# Patient Record
Sex: FEMALE | Race: WHITE | NOT HISPANIC OR LATINO | Employment: FULL TIME | URBAN - METROPOLITAN AREA
[De-identification: names, ages, dates, MRNs, and addresses within clinical notes are randomized per-mention and may not be internally consistent; named-entity substitution may affect disease eponyms.]

---

## 2023-03-30 DIAGNOSIS — I10 PRIMARY HYPERTENSION: Primary | ICD-10-CM

## 2023-03-30 PROBLEM — Z96.1 PSEUDOPHAKIA OF BOTH EYES: Status: ACTIVE | Noted: 2023-03-30

## 2023-03-30 PROBLEM — H43.813 PVD (POSTERIOR VITREOUS DETACHMENT), BOTH EYES: Status: ACTIVE | Noted: 2023-03-30

## 2023-03-30 PROBLEM — K64.9 HEMORRHOIDS: Status: ACTIVE | Noted: 2023-03-30

## 2023-03-30 PROBLEM — H25.012 CORTICAL AGE-RELATED CATARACT OF LEFT EYE: Status: ACTIVE | Noted: 2023-03-30

## 2023-03-30 PROBLEM — M06.9 RHEUMATOID ARTHRITIS (MULTI): Status: ACTIVE | Noted: 2023-03-30

## 2023-03-30 PROBLEM — I51.89 DIASTOLIC DYSFUNCTION: Status: ACTIVE | Noted: 2023-03-30

## 2023-03-30 PROBLEM — J30.9 ALLERGIC RHINITIS: Status: ACTIVE | Noted: 2023-03-30

## 2023-03-30 PROBLEM — R60.0 PERIPHERAL EDEMA: Status: ACTIVE | Noted: 2023-03-30

## 2023-03-30 PROBLEM — H26.493 PCO (POSTERIOR CAPSULAR OPACIFICATION), BILATERAL: Status: ACTIVE | Noted: 2023-03-30

## 2023-03-30 PROBLEM — E11.9 DIABETES MELLITUS TYPE 2 WITHOUT RETINOPATHY (MULTI): Status: ACTIVE | Noted: 2023-03-30

## 2023-03-30 PROBLEM — D50.9 IRON DEFICIENCY ANEMIA: Status: ACTIVE | Noted: 2023-03-30

## 2023-03-30 PROBLEM — H35.30 AGE-RELATED MACULAR DEGENERATION: Status: ACTIVE | Noted: 2023-03-30

## 2023-03-30 PROBLEM — E55.9 VITAMIN D DEFICIENCY: Status: ACTIVE | Noted: 2023-03-30

## 2023-03-30 PROBLEM — M19.90 INFLAMMATORY ARTHRITIS: Status: ACTIVE | Noted: 2023-03-30

## 2023-03-30 PROBLEM — R42 DIZZINESS: Status: ACTIVE | Noted: 2023-03-30

## 2023-03-30 PROBLEM — R70.0 ESR RAISED: Status: ACTIVE | Noted: 2023-03-30

## 2023-03-30 PROBLEM — M17.0 PRIMARY OSTEOARTHRITIS OF KNEES, BILATERAL: Status: ACTIVE | Noted: 2023-03-30

## 2023-03-30 PROBLEM — H43.399 VITREOUS FLOATERS: Status: ACTIVE | Noted: 2023-03-30

## 2023-03-30 PROBLEM — H43.89 VITREO-RETINAL ADHESIONS: Status: ACTIVE | Noted: 2023-03-30

## 2023-03-30 PROBLEM — H25.011 CORTICAL AGE-RELATED CATARACT OF RIGHT EYE: Status: ACTIVE | Noted: 2023-03-30

## 2023-03-30 PROBLEM — R23.3 EASY BRUISING: Status: ACTIVE | Noted: 2023-03-30

## 2023-03-30 PROBLEM — N39.46 MIXED STRESS AND URGE URINARY INCONTINENCE: Status: ACTIVE | Noted: 2023-03-30

## 2023-03-30 PROBLEM — H35.363 DRUSEN OF MACULA OF BOTH EYES: Status: ACTIVE | Noted: 2023-03-30

## 2023-03-30 PROBLEM — H26.491 POSTERIOR CAPSULAR OPACIFICATION NON VISUALLY SIGNIFICANT OF RIGHT EYE: Status: ACTIVE | Noted: 2023-03-30

## 2023-03-30 PROBLEM — H33.302 RETINAL HOLE OR TEAR, LEFT: Status: ACTIVE | Noted: 2023-03-30

## 2023-03-30 PROBLEM — M15.9 GENERALIZED OSTEOARTHRITIS: Status: ACTIVE | Noted: 2023-03-30

## 2023-03-30 PROBLEM — K57.90 DIVERTICULOSIS: Status: ACTIVE | Noted: 2023-03-30

## 2023-03-30 PROBLEM — Z98.890 HISTORY OF REPAIR OF RETINAL TEAR BY LASER PHOTOCOAGULATION: Status: ACTIVE | Noted: 2023-03-30

## 2023-03-30 PROBLEM — H25.11 AGE-RELATED NUCLEAR CATARACT OF RIGHT EYE: Status: ACTIVE | Noted: 2023-03-30

## 2023-03-30 PROBLEM — K76.0 FATTY LIVER DISEASE, NONALCOHOLIC: Status: ACTIVE | Noted: 2023-03-30

## 2023-03-30 PROBLEM — E66.01 MORBID OBESITY WITH BODY MASS INDEX (BMI) OF 40.0 OR HIGHER (MULTI): Status: ACTIVE | Noted: 2023-03-30

## 2023-03-30 PROBLEM — H52.209 ASTIGMATISM: Status: ACTIVE | Noted: 2023-03-30

## 2023-03-30 PROBLEM — R94.31 ABNORMAL EKG: Status: ACTIVE | Noted: 2023-03-30

## 2023-03-30 PROBLEM — E78.00 HYPERCHOLESTEROLEMIA: Status: ACTIVE | Noted: 2023-03-30

## 2023-03-30 PROBLEM — D12.6 ADENOMATOUS COLON POLYP: Status: ACTIVE | Noted: 2023-03-30

## 2023-03-30 PROBLEM — H52.00 HYPEROPIA: Status: ACTIVE | Noted: 2023-03-30

## 2023-03-30 PROBLEM — H33.312 RETINAL TEAR OF LEFT EYE: Status: ACTIVE | Noted: 2023-03-30

## 2023-03-30 PROBLEM — H52.10 MYOPIA: Status: ACTIVE | Noted: 2023-03-30

## 2023-03-30 PROBLEM — G47.33 OSA (OBSTRUCTIVE SLEEP APNEA): Status: ACTIVE | Noted: 2023-03-30

## 2023-03-30 PROBLEM — H52.31 ANISOMETROPIA: Status: ACTIVE | Noted: 2023-03-30

## 2023-03-30 PROBLEM — H25.12 AGE-RELATED NUCLEAR CATARACT OF LEFT EYE: Status: ACTIVE | Noted: 2023-03-30

## 2023-03-30 PROBLEM — R60.9 PERIPHERAL EDEMA: Status: ACTIVE | Noted: 2023-03-30

## 2023-03-30 PROBLEM — L72.3 SEBACEOUS CYST: Status: ACTIVE | Noted: 2023-03-30

## 2023-03-30 PROBLEM — R21 RASH: Status: ACTIVE | Noted: 2023-03-30

## 2023-03-30 RX ORDER — LISINOPRIL 40 MG/1
TABLET ORAL
Qty: 90 TABLET | Refills: 0 | Status: SHIPPED | OUTPATIENT
Start: 2023-03-30 | End: 2023-08-28 | Stop reason: SDUPTHER

## 2023-04-02 DIAGNOSIS — I10 PRIMARY HYPERTENSION: Primary | ICD-10-CM

## 2023-04-03 RX ORDER — FUROSEMIDE 20 MG/1
TABLET ORAL
Qty: 90 TABLET | Refills: 0 | Status: SHIPPED | OUTPATIENT
Start: 2023-04-03

## 2023-04-18 RX ORDER — TROSPIUM CHLORIDE 20 MG/1
1 TABLET, FILM COATED ORAL 2 TIMES DAILY
COMMUNITY
Start: 2022-11-17 | End: 2023-05-04 | Stop reason: WASHOUT

## 2023-04-18 RX ORDER — VIT C/E/ZN/COPPR/LUTEIN/ZEAXAN 250MG-90MG
1 CAPSULE ORAL 2 TIMES DAILY
COMMUNITY
Start: 2018-06-26

## 2023-04-18 RX ORDER — FUROSEMIDE 20 MG/1
1 TABLET ORAL DAILY
COMMUNITY
Start: 2019-07-30 | End: 2023-05-04 | Stop reason: SDUPTHER

## 2023-04-18 RX ORDER — LEVONORGESTREL 52 MG/1
INTRAUTERINE DEVICE INTRAUTERINE
COMMUNITY

## 2023-04-18 RX ORDER — QUINAPRIL 40 MG/1
1 TABLET ORAL DAILY
COMMUNITY
Start: 2014-06-16 | End: 2023-05-04 | Stop reason: WASHOUT

## 2023-04-18 RX ORDER — ACETAMINOPHEN 500 MG
TABLET ORAL 4 TIMES DAILY
COMMUNITY
Start: 2019-07-24

## 2023-04-18 RX ORDER — METAXALONE 800 MG/1
TABLET ORAL 2 TIMES DAILY
COMMUNITY
Start: 2021-04-10

## 2023-04-18 RX ORDER — SULFASALAZINE 500 MG/1
TABLET ORAL
COMMUNITY
End: 2024-05-23 | Stop reason: SDUPTHER

## 2023-04-18 RX ORDER — METFORMIN HYDROCHLORIDE 500 MG/1
500 TABLET, EXTENDED RELEASE ORAL DAILY
COMMUNITY
End: 2023-08-28 | Stop reason: SDUPTHER

## 2023-04-18 RX ORDER — METHOTREXATE 2.5 MG/1
TABLET ORAL
COMMUNITY
Start: 2019-07-24 | End: 2024-05-23 | Stop reason: ALTCHOICE

## 2023-04-18 RX ORDER — FOLIC ACID 1 MG/1
1 TABLET ORAL DAILY
COMMUNITY
Start: 2019-07-24

## 2023-04-18 RX ORDER — CELECOXIB 200 MG/1
400 CAPSULE ORAL DAILY
COMMUNITY
End: 2024-05-23 | Stop reason: SDUPTHER

## 2023-04-18 RX ORDER — CHOLECALCIFEROL (VITAMIN D3) 25 MCG
TABLET ORAL
COMMUNITY
Start: 2014-01-30

## 2023-04-18 RX ORDER — TROSPIUM CHLORIDE ER 60 MG/1
1 CAPSULE ORAL DAILY
COMMUNITY
Start: 2023-01-23

## 2023-04-18 RX ORDER — ATORVASTATIN CALCIUM 20 MG/1
20 TABLET, FILM COATED ORAL DAILY
COMMUNITY
End: 2023-11-27 | Stop reason: SDUPTHER

## 2023-04-18 RX ORDER — AMLODIPINE BESYLATE 2.5 MG/1
3 TABLET ORAL DAILY
COMMUNITY
Start: 2021-06-22 | End: 2023-06-22 | Stop reason: SDUPTHER

## 2023-04-18 RX ORDER — BUPROPION HYDROCHLORIDE 300 MG/1
1 TABLET ORAL DAILY
COMMUNITY
Start: 2022-02-02

## 2023-05-04 ENCOUNTER — OFFICE VISIT (OUTPATIENT)
Dept: PRIMARY CARE | Facility: CLINIC | Age: 63
End: 2023-05-04
Payer: COMMERCIAL

## 2023-05-04 VITALS — SYSTOLIC BLOOD PRESSURE: 132 MMHG | HEART RATE: 84 BPM | DIASTOLIC BLOOD PRESSURE: 76 MMHG

## 2023-05-04 DIAGNOSIS — G47.33 OSA (OBSTRUCTIVE SLEEP APNEA): ICD-10-CM

## 2023-05-04 DIAGNOSIS — D12.3 ADENOMATOUS POLYP OF TRANSVERSE COLON: ICD-10-CM

## 2023-05-04 DIAGNOSIS — E11.9 DIABETES MELLITUS TYPE 2 WITHOUT RETINOPATHY (MULTI): Primary | ICD-10-CM

## 2023-05-04 DIAGNOSIS — M06.09 RHEUMATOID ARTHRITIS OF MULTIPLE SITES WITH NEGATIVE RHEUMATOID FACTOR (MULTI): ICD-10-CM

## 2023-05-04 DIAGNOSIS — E78.00 HYPERCHOLESTEROLEMIA: ICD-10-CM

## 2023-05-04 DIAGNOSIS — E55.9 VITAMIN D DEFICIENCY: ICD-10-CM

## 2023-05-04 DIAGNOSIS — K76.0 FATTY LIVER DISEASE, NONALCOHOLIC: ICD-10-CM

## 2023-05-04 DIAGNOSIS — I10 PRIMARY HYPERTENSION: ICD-10-CM

## 2023-05-04 PROCEDURE — 3075F SYST BP GE 130 - 139MM HG: CPT | Performed by: INTERNAL MEDICINE

## 2023-05-04 PROCEDURE — 99214 OFFICE O/P EST MOD 30 MIN: CPT | Performed by: INTERNAL MEDICINE

## 2023-05-04 PROCEDURE — 1036F TOBACCO NON-USER: CPT | Performed by: INTERNAL MEDICINE

## 2023-05-04 PROCEDURE — 3078F DIAST BP <80 MM HG: CPT | Performed by: INTERNAL MEDICINE

## 2023-05-04 PROCEDURE — 1160F RVW MEDS BY RX/DR IN RCRD: CPT | Performed by: INTERNAL MEDICINE

## 2023-05-04 PROCEDURE — 1159F MED LIST DOCD IN RCRD: CPT | Performed by: INTERNAL MEDICINE

## 2023-05-04 PROCEDURE — 3044F HG A1C LEVEL LT 7.0%: CPT | Performed by: INTERNAL MEDICINE

## 2023-05-04 PROCEDURE — 4010F ACE/ARB THERAPY RXD/TAKEN: CPT | Performed by: INTERNAL MEDICINE

## 2023-05-04 ASSESSMENT — ENCOUNTER SYMPTOMS
SLEEP DISTURBANCE: 0
JOINT SWELLING: 0
HEMATURIA: 0
WHEEZING: 0
ABDOMINAL PAIN: 0
CONSTIPATION: 1
FATIGUE: 0
BRUISES/BLEEDS EASILY: 0
PALPITATIONS: 0
WEAKNESS: 0
DIZZINESS: 1
HEADACHES: 0
BACK PAIN: 0
LIGHT-HEADEDNESS: 0
CHILLS: 0
NAUSEA: 0
EYE DISCHARGE: 0
ACTIVITY CHANGE: 0
POLYDIPSIA: 0
DIARRHEA: 0
COUGH: 0
CONFUSION: 0
RHINORRHEA: 0
NUMBNESS: 0
SINUS PRESSURE: 0
EYE PAIN: 0
FEVER: 0
POLYPHAGIA: 0
FREQUENCY: 0
VOMITING: 0
EYE ITCHING: 0
SORE THROAT: 0
DIFFICULTY URINATING: 0
FLANK PAIN: 0
MYALGIAS: 0
DYSURIA: 0
NERVOUS/ANXIOUS: 0
BLOOD IN STOOL: 0
DYSPHORIC MOOD: 0
NECK PAIN: 0
SHORTNESS OF BREATH: 0
ARTHRALGIAS: 1
ADENOPATHY: 0
CHEST TIGHTNESS: 0
FACIAL SWELLING: 0
DIAPHORESIS: 0
APPETITE CHANGE: 0

## 2023-05-04 NOTE — ASSESSMENT & PLAN NOTE
continues on sulfasalazine and methotrexate (and folate)per rheumatologist Dr. Ashford  CBC and ESR ordered per patient request

## 2023-05-04 NOTE — ASSESSMENT & PLAN NOTE
BP slightly elevated; does have white coat component and thus advised continued monitoring and call with results in the next 2 weeks  - Continue amlodipine to 7.5 mg daily ,Accupril 40 milligrams daily and Furosemide 20 mg daily;   - Urge DASH diet, urge regular exercise regimen

## 2023-05-04 NOTE — PROGRESS NOTES
Subjective   Patient ID: Belle Alarcon is a 63 y.o. female who presents for Follow-up (3m).    She has a history of DM2, hypertension, hypercholesterolemia, vitamin D deficiency, peripheral edema, and RA    BP at home: not checking but did purchase a sphygmomanometer         Review of Systems   Constitutional:  Negative for activity change, appetite change, chills, diaphoresis, fatigue and fever.   HENT:  Negative for congestion, ear discharge, ear pain, facial swelling, postnasal drip, rhinorrhea, sinus pressure and sore throat.    Eyes:  Negative for pain, discharge and itching.   Respiratory:  Negative for cough, chest tightness, shortness of breath and wheezing.    Cardiovascular:  Negative for chest pain, palpitations and leg swelling.   Gastrointestinal:  Positive for constipation (not currently but occasionally). Negative for abdominal pain, blood in stool, diarrhea, nausea and vomiting.   Endocrine: Negative for cold intolerance, heat intolerance, polydipsia, polyphagia and polyuria.   Genitourinary:  Negative for difficulty urinating, dysuria, flank pain, frequency and hematuria.   Musculoskeletal:  Positive for arthralgias (knees bilaterally). Negative for back pain, joint swelling, myalgias and neck pain.   Skin: Negative.    Neurological:  Positive for dizziness (very occasional vertigo with changes). Negative for syncope, weakness, light-headedness, numbness and headaches.   Hematological:  Negative for adenopathy. Does not bruise/bleed easily.   Psychiatric/Behavioral:  Negative for behavioral problems, confusion, dysphoric mood and sleep disturbance. The patient is not nervous/anxious.        Objective   /76   Pulse 84     Physical Exam  Constitutional:       Appearance: Normal appearance. She is obese.   HENT:      Head: Normocephalic and atraumatic.   Eyes:      Pupils: Pupils are equal, round, and reactive to light.   Cardiovascular:      Rate and Rhythm: Normal rate and regular rhythm.       Pulses: Normal pulses.      Heart sounds: Normal heart sounds.   Pulmonary:      Effort: Pulmonary effort is normal.      Breath sounds: Normal breath sounds.   Abdominal:      General: Bowel sounds are normal.      Palpations: Abdomen is soft. There is no mass.      Tenderness: There is no abdominal tenderness.      Hernia: No hernia is present.   Musculoskeletal:      Right lower leg: No edema.      Left lower leg: No edema.   Skin:     General: Skin is warm and dry.   Neurological:      General: No focal deficit present.      Mental Status: She is alert and oriented to person, place, and time. Mental status is at baseline.      Gait: Gait abnormal (slow with cane).   Psychiatric:         Mood and Affect: Mood normal.         Behavior: Behavior normal.         Thought Content: Thought content normal.         Judgment: Judgment normal.         Assessment/Plan   Problem List Items Addressed This Visit          Nervous    OLIVIA (obstructive sleep apnea)    Relevant Orders    Referral to Adult Sleep Medicine       Circulatory    Hypertension      BP slightly elevated; does have white coat component and thus advised continued monitoring and call with results in the next 2 weeks  - Continue amlodipine to 7.5 mg daily ,Accupril 40 milligrams daily and Furosemide 20 mg daily;   - Urge DASH diet, urge regular exercise regimen          Relevant Orders    Comprehensive Metabolic Panel       Digestive    Adenomatous colon polyp    Relevant Orders    Colonoscopy    Fatty liver disease, nonalcoholic     Urge avoid hepatotoxins and urge weight loss   LFTs            Endocrine/Metabolic    Diabetes mellitus type 2 without retinopathy (CMS/HCC) - Primary      controlled   continue Glucophage  milligrams daily; check HbA1C;   had ophthalmology evaluation 7/27/21-no retinopathy - advise schedule yearly         Relevant Orders    Hemoglobin A1C    Vitamin D deficiency     Will check Vitamin D 25-OH and will further advise          Relevant Orders    Vitamin D, Total       Other    Hypercholesterolemia     Continue atorvastatin 20 mg daily   FLP/ LFTs         Relevant Orders    Lipid Panel    Rheumatoid arthritis (CMS/HCC)     continues on sulfasalazine and methotrexate (and folate)per rheumatologist Dr. Ashford  CBC and ESR ordered per patient request          Relevant Orders    Comprehensive Metabolic Panel    CBC and Auto Differential    Sedimentation Rate

## 2023-05-04 NOTE — ASSESSMENT & PLAN NOTE
controlled   continue Glucophage  milligrams daily; check HbA1C;   had ophthalmology evaluation 7/27/21-no retinopathy - advise schedule yearly

## 2023-05-30 ENCOUNTER — LAB (OUTPATIENT)
Dept: LAB | Facility: LAB | Age: 63
End: 2023-05-30
Payer: COMMERCIAL

## 2023-05-30 DIAGNOSIS — M06.09 RHEUMATOID ARTHRITIS OF MULTIPLE SITES WITH NEGATIVE RHEUMATOID FACTOR (MULTI): ICD-10-CM

## 2023-05-30 DIAGNOSIS — E11.9 DIABETES MELLITUS TYPE 2 WITHOUT RETINOPATHY (MULTI): ICD-10-CM

## 2023-05-30 DIAGNOSIS — E78.00 HYPERCHOLESTEROLEMIA: ICD-10-CM

## 2023-05-30 DIAGNOSIS — E55.9 VITAMIN D DEFICIENCY: ICD-10-CM

## 2023-05-30 DIAGNOSIS — I10 PRIMARY HYPERTENSION: ICD-10-CM

## 2023-05-30 LAB
ALANINE AMINOTRANSFERASE (SGPT) (U/L) IN SER/PLAS: 10 U/L (ref 7–45)
ALANINE AMINOTRANSFERASE (SGPT) (U/L) IN SER/PLAS: NORMAL
ALBUMIN (G/DL) IN SER/PLAS: 3.9 G/DL (ref 3.4–5)
ALBUMIN (G/DL) IN SER/PLAS: NORMAL
ALKALINE PHOSPHATASE (U/L) IN SER/PLAS: 128 U/L (ref 33–136)
ALKALINE PHOSPHATASE (U/L) IN SER/PLAS: NORMAL
ANION GAP IN SER/PLAS: 16 MMOL/L (ref 10–20)
ANION GAP IN SER/PLAS: NORMAL
ASPARTATE AMINOTRANSFERASE (SGOT) (U/L) IN SER/PLAS: 11 U/L (ref 9–39)
ASPARTATE AMINOTRANSFERASE (SGOT) (U/L) IN SER/PLAS: NORMAL
BASOPHILS (10*3/UL) IN BLOOD BY AUTOMATED COUNT: 0.06 X10E9/L (ref 0–0.1)
BASOPHILS/100 LEUKOCYTES IN BLOOD BY AUTOMATED COUNT: 0.8 % (ref 0–2)
BILIRUBIN TOTAL (MG/DL) IN SER/PLAS: 0.3 MG/DL (ref 0–1.2)
BILIRUBIN TOTAL (MG/DL) IN SER/PLAS: NORMAL
C REACTIVE PROTEIN (MG/L) IN SER/PLAS: 1.47 MG/DL
CALCIDIOL (25 OH VITAMIN D3) (NG/ML) IN SER/PLAS: 64 NG/ML
CALCIUM (MG/DL) IN SER/PLAS: 9.9 MG/DL (ref 8.6–10.6)
CALCIUM (MG/DL) IN SER/PLAS: NORMAL
CARBON DIOXIDE, TOTAL (MMOL/L) IN SER/PLAS: 24 MMOL/L (ref 21–32)
CARBON DIOXIDE, TOTAL (MMOL/L) IN SER/PLAS: NORMAL
CHLORIDE (MMOL/L) IN SER/PLAS: 107 MMOL/L (ref 98–107)
CHLORIDE (MMOL/L) IN SER/PLAS: NORMAL
CHOLESTEROL (MG/DL) IN SER/PLAS: 167 MG/DL (ref 0–199)
CHOLESTEROL IN HDL (MG/DL) IN SER/PLAS: 51.6 MG/DL
CHOLESTEROL/HDL RATIO: 3.2
CREATININE (MG/DL) IN SER/PLAS: 0.87 MG/DL (ref 0.5–1.05)
CREATININE (MG/DL) IN SER/PLAS: NORMAL
EOSINOPHILS (10*3/UL) IN BLOOD BY AUTOMATED COUNT: 0.08 X10E9/L (ref 0–0.7)
EOSINOPHILS/100 LEUKOCYTES IN BLOOD BY AUTOMATED COUNT: 1.1 % (ref 0–6)
ERYTHROCYTE DISTRIBUTION WIDTH (RATIO) BY AUTOMATED COUNT: 14.8 % (ref 11.5–14.5)
ERYTHROCYTE DISTRIBUTION WIDTH (RATIO) BY AUTOMATED COUNT: NORMAL
ERYTHROCYTE MEAN CORPUSCULAR HEMOGLOBIN CONCENTRATION (G/DL) BY AUTOMATED: 31.3 G/DL (ref 32–36)
ERYTHROCYTE MEAN CORPUSCULAR HEMOGLOBIN CONCENTRATION (G/DL) BY AUTOMATED: NORMAL
ERYTHROCYTE MEAN CORPUSCULAR VOLUME (FL) BY AUTOMATED COUNT: 93 FL (ref 80–100)
ERYTHROCYTE MEAN CORPUSCULAR VOLUME (FL) BY AUTOMATED COUNT: NORMAL
ERYTHROCYTES (10*6/UL) IN BLOOD BY AUTOMATED COUNT: 4 X10E12/L (ref 4–5.2)
ERYTHROCYTES (10*6/UL) IN BLOOD BY AUTOMATED COUNT: NORMAL
ESTIMATED AVERAGE GLUCOSE FOR HBA1C: 146 MG/DL
GFR FEMALE: 74 ML/MIN/1.73M2
GFR FEMALE: NORMAL
GFR MALE: NORMAL
GLUCOSE (MG/DL) IN SER/PLAS: 134 MG/DL (ref 74–99)
GLUCOSE (MG/DL) IN SER/PLAS: NORMAL
HEMATOCRIT (%) IN BLOOD BY AUTOMATED COUNT: 37.1 % (ref 36–46)
HEMATOCRIT (%) IN BLOOD BY AUTOMATED COUNT: NORMAL
HEMOGLOBIN (G/DL) IN BLOOD: 11.6 G/DL (ref 12–16)
HEMOGLOBIN (G/DL) IN BLOOD: NORMAL
HEMOGLOBIN A1C/HEMOGLOBIN TOTAL IN BLOOD: 6.7 %
IMMATURE GRANULOCYTES/100 LEUKOCYTES IN BLOOD BY AUTOMATED COUNT: 0.1 % (ref 0–0.9)
LDL: 100 MG/DL (ref 0–99)
LEUKOCYTES (10*3/UL) IN BLOOD BY AUTOMATED COUNT: 7.5 X10E9/L (ref 4.4–11.3)
LEUKOCYTES (10*3/UL) IN BLOOD BY AUTOMATED COUNT: NORMAL
LYMPHOCYTES (10*3/UL) IN BLOOD BY AUTOMATED COUNT: 2.04 X10E9/L (ref 1.2–4.8)
LYMPHOCYTES/100 LEUKOCYTES IN BLOOD BY AUTOMATED COUNT: 27.4 % (ref 13–44)
MONOCYTES (10*3/UL) IN BLOOD BY AUTOMATED COUNT: 0.57 X10E9/L (ref 0.1–1)
MONOCYTES/100 LEUKOCYTES IN BLOOD BY AUTOMATED COUNT: 7.7 % (ref 2–10)
NEUTROPHILS (10*3/UL) IN BLOOD BY AUTOMATED COUNT: 4.69 X10E9/L (ref 1.2–7.7)
NEUTROPHILS/100 LEUKOCYTES IN BLOOD BY AUTOMATED COUNT: 62.9 % (ref 40–80)
NRBC (PER 100 WBCS) BY AUTOMATED COUNT: 0 /100 WBC (ref 0–0)
NRBC (PER 100 WBCS) BY AUTOMATED COUNT: NORMAL
PLATELETS (10*3/UL) IN BLOOD AUTOMATED COUNT: 217 X10E9/L (ref 150–450)
PLATELETS (10*3/UL) IN BLOOD AUTOMATED COUNT: NORMAL
POTASSIUM (MMOL/L) IN SER/PLAS: 4.6 MMOL/L (ref 3.5–5.3)
POTASSIUM (MMOL/L) IN SER/PLAS: NORMAL
PROTEIN TOTAL: 6.8 G/DL (ref 6.4–8.2)
PROTEIN TOTAL: NORMAL
SEDIMENTATION RATE, ERYTHROCYTE: 34 MM/H (ref 0–30)
SEDIMENTATION RATE, ERYTHROCYTE: NORMAL
SODIUM (MMOL/L) IN SER/PLAS: 142 MMOL/L (ref 136–145)
SODIUM (MMOL/L) IN SER/PLAS: NORMAL
TRIGLYCERIDE (MG/DL) IN SER/PLAS: 75 MG/DL (ref 0–149)
UREA NITROGEN (MG/DL) IN SER/PLAS: 31 MG/DL (ref 6–23)
UREA NITROGEN (MG/DL) IN SER/PLAS: NORMAL
VLDL: 15 MG/DL (ref 0–40)

## 2023-05-30 PROCEDURE — 36415 COLL VENOUS BLD VENIPUNCTURE: CPT

## 2023-05-30 PROCEDURE — 83036 HEMOGLOBIN GLYCOSYLATED A1C: CPT

## 2023-05-30 PROCEDURE — 82607 VITAMIN B-12: CPT

## 2023-05-30 PROCEDURE — 85025 COMPLETE CBC W/AUTO DIFF WBC: CPT

## 2023-05-30 PROCEDURE — 82746 ASSAY OF FOLIC ACID SERUM: CPT

## 2023-05-30 PROCEDURE — 82306 VITAMIN D 25 HYDROXY: CPT

## 2023-05-30 PROCEDURE — 85652 RBC SED RATE AUTOMATED: CPT

## 2023-05-30 PROCEDURE — 80053 COMPREHEN METABOLIC PANEL: CPT

## 2023-05-30 PROCEDURE — 80061 LIPID PANEL: CPT

## 2023-05-31 LAB
COBALAMIN (VITAMIN B12) (PG/ML) IN SER/PLAS: 203 PG/ML (ref 211–911)
FOLATE (NG/ML) IN SER/PLAS: >24 NG/ML

## 2023-06-22 DIAGNOSIS — I10 HYPERTENSION, UNSPECIFIED TYPE: ICD-10-CM

## 2023-06-22 RX ORDER — QUINAPRIL 40 MG/1
40 TABLET ORAL
COMMUNITY
Start: 2019-07-16

## 2023-06-22 RX ORDER — HYDROCHLOROTHIAZIDE 25 MG/1
25 TABLET ORAL
COMMUNITY
Start: 2019-07-18 | End: 2024-05-23 | Stop reason: ALTCHOICE

## 2023-06-22 RX ORDER — IBUPROFEN 800 MG/1
800 TABLET ORAL EVERY 8 HOURS PRN
COMMUNITY
Start: 2012-03-25 | End: 2024-05-23 | Stop reason: ALTCHOICE

## 2023-06-22 RX ORDER — CYCLOBENZAPRINE HCL 10 MG
10 TABLET ORAL EVERY 8 HOURS PRN
COMMUNITY
Start: 2012-03-25 | End: 2024-05-23 | Stop reason: ALTCHOICE

## 2023-06-23 RX ORDER — AMLODIPINE BESYLATE 2.5 MG/1
7.5 TABLET ORAL DAILY
Qty: 270 TABLET | Refills: 1 | Status: SHIPPED | OUTPATIENT
Start: 2023-06-23 | End: 2023-12-21 | Stop reason: SDUPTHER

## 2023-07-21 PROBLEM — S80.00XA CONTUSION OF KNEE: Status: ACTIVE | Noted: 2019-08-18
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
## 2023-07-21 PROBLEM — R92.8 ABNORMAL FINDING ON MAMMOGRAPHY: Status: ACTIVE | Noted: 2023-07-21

## 2023-07-21 PROBLEM — M25.569 KNEE PAIN: Status: ACTIVE | Noted: 2019-08-18

## 2023-07-21 PROBLEM — F32.A DEPRESSIVE DISORDER: Status: ACTIVE | Noted: 2019-08-18

## 2023-07-21 PROBLEM — H61.20 IMPACTED CERUMEN: Status: ACTIVE | Noted: 2019-08-18

## 2023-07-21 PROBLEM — Z98.890 HISTORY OF LASER REFRACTIVE SURGERY: Status: ACTIVE | Noted: 2023-07-21

## 2023-07-21 PROBLEM — R60.9 EDEMA: Status: ACTIVE | Noted: 2019-08-18

## 2023-07-21 PROBLEM — T14.90XA INJURY: Status: ACTIVE | Noted: 2019-08-18

## 2023-07-21 PROBLEM — R79.9 ABNORMAL BLOOD CHEMISTRY LEVEL: Status: ACTIVE | Noted: 2019-08-18

## 2023-07-21 PROBLEM — H10.9 CONJUNCTIVITIS: Status: ACTIVE | Noted: 2019-08-18

## 2023-07-21 PROBLEM — I10 BENIGN ESSENTIAL HYPERTENSION: Status: ACTIVE | Noted: 2019-08-18

## 2023-07-21 PROBLEM — F41.1 ANXIETY STATE: Status: ACTIVE | Noted: 2019-08-18

## 2023-08-28 DIAGNOSIS — I10 PRIMARY HYPERTENSION: ICD-10-CM

## 2023-08-28 DIAGNOSIS — E11.9 DIABETES MELLITUS TYPE 2 WITHOUT RETINOPATHY (MULTI): Primary | ICD-10-CM

## 2023-08-28 RX ORDER — METFORMIN HYDROCHLORIDE 500 MG/1
500 TABLET, EXTENDED RELEASE ORAL DAILY
Qty: 90 TABLET | Refills: 1 | Status: SHIPPED | OUTPATIENT
Start: 2023-08-28

## 2023-08-28 RX ORDER — LISINOPRIL 40 MG/1
40 TABLET ORAL DAILY
Qty: 90 TABLET | Refills: 1 | Status: SHIPPED | OUTPATIENT
Start: 2023-08-28

## 2023-11-14 ASSESSMENT — SLIT LAMP EXAM - LIDS
COMMENTS: GOOD POSITION
COMMENTS: GOOD POSITION

## 2023-11-14 ASSESSMENT — CUP TO DISC RATIO
OD_RATIO: 0.3
OS_RATIO: 0.3

## 2023-11-14 ASSESSMENT — EXTERNAL EXAM - LEFT EYE: OS_EXAM: NORMAL

## 2023-11-14 ASSESSMENT — EXTERNAL EXAM - RIGHT EYE: OD_EXAM: NORMAL

## 2023-11-14 NOTE — PROGRESS NOTES
Diabetes mellitus type 2 without msupfqhimzcG69.9  -HbA1c= 6.7 (5/30/23). No diabetic retinopathy seen on dilated exam. Continue close monitoring of blood glucose, blood pressure, and cholesterol. Plan for annual dilated eye exam.    History of YAG laser capsulotomy, left eye  PCO (posterior capsular opacification), tioxoF43.491  PCO (posterior capsular opacification), leftH26.492  Pseudophakia of both eyesZ96.1  -S/p YAG capsulotomy OS 9/28/21 - vision improved  -Now with visually significant PCO OD. Symptoms: Gradual decrease in vision OD x months. Harder to read small print. More difficulty seeing small words/numbers on TV. Having more trouble seeing road signs when driving. Glare from headlights when driving at night.   -Discussed with patient option of YAG capsulotomy OD - patient would like to proceed. Will schedule - f/u next available YAG capsulotomy OD at [facility] - refract, glare/BAT, dilate OD only.     Drusen (degenerative) of macula, rxfinofxkV34.363  History of repair of retinal tear by laser glnpsqiiwyhmcutoZ78.890  Retinal hole or tear, leftH33.302  Vitreo-retinal szuypqlotT72.89  PVD (posterior vitreous detachment), both eyesH43.813  -History of retinal tear s/p LR OS around 2018  -No retinal tear or detachment seen on exam. Retinal detachment symptoms discussed.   -Plan for OCT macula OU at follow up visit after YAG capsulotomy.     Dry eyes, bilateral  -Use artificial tears 2-4 times a day (Refresh, Systane, Theratears, Genteal, Blink)    Hx of ETSSOU21.890  KjbontxcvH03.00  UlqhkfA35.10  JdjlznrkkutX88.209  -F/u next available YAG capsulotomy OD at [facility] - refract, glare/BAT, dilate OD only.   -New Rx given per patient request - d/w patient it's possible Rx may change following YAG capsulotomy. Patient states her understanding.

## 2023-11-15 ENCOUNTER — OFFICE VISIT (OUTPATIENT)
Dept: OPHTHALMOLOGY | Facility: CLINIC | Age: 63
End: 2023-11-15
Payer: COMMERCIAL

## 2023-11-15 DIAGNOSIS — Z98.42 HISTORY OF YAG LASER CAPSULOTOMY OF LENS OF LEFT EYE: ICD-10-CM

## 2023-11-15 DIAGNOSIS — H52.01 HYPEROPIA OF RIGHT EYE: ICD-10-CM

## 2023-11-15 DIAGNOSIS — H26.491 PCO (POSTERIOR CAPSULAR OPACIFICATION), RIGHT: ICD-10-CM

## 2023-11-15 DIAGNOSIS — H35.363 DRUSEN OF MACULA OF BOTH EYES: ICD-10-CM

## 2023-11-15 DIAGNOSIS — Z98.890 HISTORY OF REPAIR OF RETINAL TEAR BY LASER PHOTOCOAGULATION: ICD-10-CM

## 2023-11-15 DIAGNOSIS — H43.89 VITREO-RETINAL ADHESIONS: ICD-10-CM

## 2023-11-15 DIAGNOSIS — Z96.1 PSEUDOPHAKIA: ICD-10-CM

## 2023-11-15 DIAGNOSIS — H52.201 ASTIGMATISM OF RIGHT EYE, UNSPECIFIED TYPE: ICD-10-CM

## 2023-11-15 DIAGNOSIS — Z98.890 HISTORY OF LASER REFRACTIVE SURGERY: ICD-10-CM

## 2023-11-15 DIAGNOSIS — H26.492 PCO (POSTERIOR CAPSULAR OPACIFICATION), LEFT: ICD-10-CM

## 2023-11-15 DIAGNOSIS — H04.123 DRY EYES, BILATERAL: ICD-10-CM

## 2023-11-15 DIAGNOSIS — H33.302 RETINAL HOLE OR TEAR, LEFT: ICD-10-CM

## 2023-11-15 DIAGNOSIS — H52.4 PRESBYOPIA: ICD-10-CM

## 2023-11-15 DIAGNOSIS — E11.9 CONTROLLED TYPE 2 DIABETES MELLITUS WITHOUT COMPLICATION, WITHOUT LONG-TERM CURRENT USE OF INSULIN (MULTI): Primary | ICD-10-CM

## 2023-11-15 DIAGNOSIS — H43.813 PVD (POSTERIOR VITREOUS DETACHMENT), BOTH EYES: ICD-10-CM

## 2023-11-15 DIAGNOSIS — H52.12 MYOPIA OF LEFT EYE: ICD-10-CM

## 2023-11-15 PROCEDURE — 92014 COMPRE OPH EXAM EST PT 1/>: CPT | Performed by: OPHTHALMOLOGY

## 2023-11-15 PROCEDURE — 92015 DETERMINE REFRACTIVE STATE: CPT | Performed by: OPHTHALMOLOGY

## 2023-11-15 ASSESSMENT — ENCOUNTER SYMPTOMS
ENDOCRINE NEGATIVE: 0
ALLERGIC/IMMUNOLOGIC NEGATIVE: 0
MUSCULOSKELETAL NEGATIVE: 0
GASTROINTESTINAL NEGATIVE: 0
RESPIRATORY NEGATIVE: 0
PSYCHIATRIC NEGATIVE: 0
CONSTITUTIONAL NEGATIVE: 0
EYES NEGATIVE: 0
HEMATOLOGIC/LYMPHATIC NEGATIVE: 0
CARDIOVASCULAR NEGATIVE: 0
NEUROLOGICAL NEGATIVE: 0

## 2023-11-15 ASSESSMENT — REFRACTION_WEARINGRX
OD_CYLINDER: -0.75
OD_SPHERE: +0.25
OS_SPHERE: +0.75
OS_AXIS: 155
OS_ADD: +2.50
OD_AXIS: 109
SPECS_TYPE: PAL
OD_ADD: +2.50
OS_CYLINDER: -0.25

## 2023-11-15 ASSESSMENT — VISUAL ACUITY
CORRECTION_TYPE: GLASSES
OD_CC: 20/30
OS_CC: 20/25
OS_BAT_HIGH: 20/25
METHOD: SNELLEN - LINEAR
OD_BAT_HIGH: 20/200
OD_CC+: -2

## 2023-11-15 ASSESSMENT — CONF VISUAL FIELD
OS_SUPERIOR_NASAL_RESTRICTION: 0
OD_NORMAL: 1
OD_SUPERIOR_NASAL_RESTRICTION: 0
OD_INFERIOR_NASAL_RESTRICTION: 0
OD_INFERIOR_TEMPORAL_RESTRICTION: 0
OD_SUPERIOR_TEMPORAL_RESTRICTION: 0
OS_INFERIOR_TEMPORAL_RESTRICTION: 0
OS_INFERIOR_NASAL_RESTRICTION: 0
OS_SUPERIOR_TEMPORAL_RESTRICTION: 0
OS_NORMAL: 1

## 2023-11-15 ASSESSMENT — REFRACTION_MANIFEST
OD_CYLINDER: -1.50
OS_CYLINDER: -0.25
OS_ADD: +2.50
OD_AXIS: 110
OS_SPHERE: -0.75
OD_SPHERE: -0.50
OD_ADD: +2.50
OS_AXIS: 155

## 2023-11-15 ASSESSMENT — TONOMETRY
OS_IOP_MMHG: 17
IOP_METHOD: TONOPEN
OD_IOP_MMHG: 18

## 2023-11-27 DIAGNOSIS — E78.00 HYPERCHOLESTEROLEMIA: Primary | ICD-10-CM

## 2023-11-27 RX ORDER — ATORVASTATIN CALCIUM 20 MG/1
20 TABLET, FILM COATED ORAL DAILY
Qty: 90 TABLET | Refills: 0 | Status: SHIPPED | OUTPATIENT
Start: 2023-11-27

## 2023-12-21 DIAGNOSIS — I10 HYPERTENSION, UNSPECIFIED TYPE: ICD-10-CM

## 2023-12-21 RX ORDER — AMLODIPINE BESYLATE 2.5 MG/1
7.5 TABLET ORAL DAILY
Qty: 270 TABLET | Refills: 0 | Status: SHIPPED | OUTPATIENT
Start: 2023-12-21

## 2024-02-04 ASSESSMENT — SLIT LAMP EXAM - LIDS
COMMENTS: GOOD POSITION
COMMENTS: GOOD POSITION

## 2024-02-04 ASSESSMENT — EXTERNAL EXAM - RIGHT EYE: OD_EXAM: NORMAL

## 2024-02-04 ASSESSMENT — EXTERNAL EXAM - LEFT EYE: OS_EXAM: NORMAL

## 2024-02-04 ASSESSMENT — CUP TO DISC RATIO: OD_RATIO: 0.3

## 2024-02-05 NOTE — PROGRESS NOTES
History of YAG laser capsulotomy, left eye  PCO (posterior capsular opacification), zcbvhT82.491  PCO (posterior capsular opacification), leftH26.492  Pseudophakia of both eyesZ96.1  -S/p YAG capsulotomy OS 9/28/21 - vision improved  -Visually significant PCO right eye. Symptoms: Gradual decrease in vision OD x months. Harder to read small print. More difficulty seeing small words/numbers on TV. Having more trouble seeing road signs when driving. Glare from headlights when driving at night.  Discussed diagnosis with patient and option of YAG capsulotomy. Discussed procedure. Discussed potential risks, benefits, and alternatives, including but not limited to: pain, inflammation, edema, retinal tear/detachment, floaters, increased eye pressure, damage to other ocular structures, damage to/dislocation of lens implant, glare, need to repeat procedure, loss of vision or loss of eye. Patient understands and wishes to proceed. Consent signed.  YAG capsulotomy right eye done February 6, 2024. Advised to use artificial tears PRN. F/u 4-6 weeks - refract OU, dilate OD, OCT macula. Call or return sooner if any redness, pain, decreased vision, flashes, or floaters.   Pulse = 47  TE = 37.6  Power = 0.8 mJ  Post-YAG IOP = 18    Diabetes mellitus type 2 without gvlkxshxyieY55.9  -HbA1c= 6.7 (5/30/23). No diabetic retinopathy seen on dilated exam. Continue close monitoring of blood glucose, blood pressure, and cholesterol. Plan for annual dilated eye exam.    Drusen (degenerative) of macula, urcgufwfqT85.363  History of repair of retinal tear by laser durgbozrkuyuvoceP07.890  Retinal hole or tear, leftH33.302  Vitreo-retinal ktslmowemN39.89  PVD (posterior vitreous detachment), both eyesH43.813  -History of retinal tear s/p LR OS around 2018  -No retinal tear or detachment seen on exam. Retinal detachment symptoms discussed.   -Plan for OCT macula OU at follow up visit after YAG capsulotomy.     Dry eyes, bilateral  -Use  artificial tears 2-4 times a day (Refresh, Systane, Theratears, Genteal, Blink)    Hx of GNUEPU70.890  GxsttpdvjN58.00  PunxhaK17.10  UgotfbskptfP58.209  -F/u 4-6 weeks - refract OU, dilate OD, OCT macula.    -New Rx given per patient request - difficult refraction OD today due to visually significant PCO. Patient obtained new glasses at end of December and having difficulty with Rx. D/w patient it's possible Rx may change following YAG capsulotomy - ideally would recommend changing Rx after next visit, but may not be able to depending on return policy. Patient states her understanding.

## 2024-02-06 ENCOUNTER — OFFICE VISIT (OUTPATIENT)
Dept: OPHTHALMOLOGY | Facility: CLINIC | Age: 64
End: 2024-02-06
Payer: COMMERCIAL

## 2024-02-06 DIAGNOSIS — H04.123 DRY EYES, BILATERAL: ICD-10-CM

## 2024-02-06 DIAGNOSIS — H33.302 RETINAL HOLE OR TEAR, LEFT: ICD-10-CM

## 2024-02-06 DIAGNOSIS — Z98.890 HISTORY OF LASER REFRACTIVE SURGERY: ICD-10-CM

## 2024-02-06 DIAGNOSIS — H52.201 ASTIGMATISM OF RIGHT EYE, UNSPECIFIED TYPE: ICD-10-CM

## 2024-02-06 DIAGNOSIS — H35.363 DRUSEN OF MACULA OF BOTH EYES: ICD-10-CM

## 2024-02-06 DIAGNOSIS — E11.9 CONTROLLED TYPE 2 DIABETES MELLITUS WITHOUT COMPLICATION, WITHOUT LONG-TERM CURRENT USE OF INSULIN (MULTI): ICD-10-CM

## 2024-02-06 DIAGNOSIS — H43.813 PVD (POSTERIOR VITREOUS DETACHMENT), BOTH EYES: ICD-10-CM

## 2024-02-06 DIAGNOSIS — H52.4 PRESBYOPIA: ICD-10-CM

## 2024-02-06 DIAGNOSIS — H26.492 PCO (POSTERIOR CAPSULAR OPACIFICATION), LEFT: ICD-10-CM

## 2024-02-06 DIAGNOSIS — Z98.890 HISTORY OF REPAIR OF RETINAL TEAR BY LASER PHOTOCOAGULATION: ICD-10-CM

## 2024-02-06 DIAGNOSIS — H43.89 VITREO-RETINAL ADHESIONS: ICD-10-CM

## 2024-02-06 DIAGNOSIS — Z98.42 HISTORY OF YAG LASER CAPSULOTOMY OF LENS OF LEFT EYE: ICD-10-CM

## 2024-02-06 DIAGNOSIS — H52.01 HYPEROPIA OF RIGHT EYE: ICD-10-CM

## 2024-02-06 DIAGNOSIS — H26.491 PCO (POSTERIOR CAPSULAR OPACIFICATION), RIGHT: Primary | ICD-10-CM

## 2024-02-06 DIAGNOSIS — Z96.1 PSEUDOPHAKIA: ICD-10-CM

## 2024-02-06 DIAGNOSIS — H52.12 MYOPIA OF LEFT EYE: ICD-10-CM

## 2024-02-06 PROCEDURE — 66821 AFTER CATARACT LASER SURGERY: CPT | Mod: RIGHT SIDE | Performed by: OPHTHALMOLOGY

## 2024-02-06 PROCEDURE — 99214 OFFICE O/P EST MOD 30 MIN: CPT | Performed by: OPHTHALMOLOGY

## 2024-02-06 ASSESSMENT — VISUAL ACUITY
OD_BAT_MED: 20/200
OD_CC: 20/50
CORRECTION_TYPE: GLASSES
OS_CC: 20/25+
METHOD: SNELLEN - LINEAR

## 2024-02-06 ASSESSMENT — REFRACTION_WEARINGRX
OD_AXIS: 115
OS_AXIS: 145
OS_SPHERE: +0.50
OD_SPHERE: +0.25
OS_CYLINDER: SPHERE
OD_SPHERE: -0.50
OD_ADD: +2.50
OD_AXIS: 105
OD_CYLINDER: -0.75
OS_ADD: +2.50
OS_SPHERE: -0.75
OS_CYLINDER: -0.25
OD_ADD: +2.50
OD_CYLINDER: -1.75
SPECS_TYPE: NEW GLASSES
SPECS_TYPE: OLD GLASSES
OS_ADD: +2.50

## 2024-02-06 ASSESSMENT — REFRACTION_MANIFEST
OS_AXIS: 155
OS_SPHERE: +0.75
OS_ADD: +2.50
OD_CYLINDER: -1.25
OD_ADD: +2.50
OD_SPHERE: -0.25
OS_CYLINDER: -0.25
OD_AXIS: 105

## 2024-02-06 ASSESSMENT — ENCOUNTER SYMPTOMS
PSYCHIATRIC NEGATIVE: 0
CARDIOVASCULAR NEGATIVE: 0
EYES NEGATIVE: 1
GASTROINTESTINAL NEGATIVE: 0
HEMATOLOGIC/LYMPHATIC NEGATIVE: 0
CONSTITUTIONAL NEGATIVE: 0
ENDOCRINE NEGATIVE: 0
ALLERGIC/IMMUNOLOGIC NEGATIVE: 0
MUSCULOSKELETAL NEGATIVE: 0
NEUROLOGICAL NEGATIVE: 0
RESPIRATORY NEGATIVE: 0

## 2024-02-06 ASSESSMENT — TONOMETRY
OD_IOP_MMHG: 20
IOP_METHOD: TONOPEN
OS_IOP_MMHG: 21

## 2024-02-07 NOTE — PATIENT INSTRUCTIONS
Artificial tears: 2-4 times a day x 1 week      Call immediately if any redness, pain, decreased vision, flashes, floaters, shadow/curtain in vision

## 2024-03-08 ASSESSMENT — SLIT LAMP EXAM - LIDS
COMMENTS: GOOD POSITION
COMMENTS: GOOD POSITION

## 2024-03-08 ASSESSMENT — EXTERNAL EXAM - RIGHT EYE: OD_EXAM: NORMAL

## 2024-03-08 ASSESSMENT — CUP TO DISC RATIO: OD_RATIO: 0.3

## 2024-03-08 ASSESSMENT — EXTERNAL EXAM - LEFT EYE: OS_EXAM: NORMAL

## 2024-03-09 NOTE — PROGRESS NOTES
History of YAG laser capsulotomy, left eye  PCO (posterior capsular opacification), qwwlnV58.491  PCO (posterior capsular opacification), leftH26.492  Pseudophakia of both eyesZ96.1  -S/p YAG capsulotomy OS 9/28/21 - vision improved  -S/p YAG capsulotomy OD 2/6/24 - vision improved.  -No retinal tear or detachment seen on exam. Retinal detachment symptoms discussed.   -F/u 1 year for comprehensive exam with OCT macula, sooner if any vision change.     Diabetes mellitus type 2 without bizdhrahnrkW18.9  -HbA1c= 6.7 (5/30/23). No diabetic retinopathy seen on dilated exam. Continue close monitoring of blood glucose, blood pressure, and cholesterol. Plan for annual dilated eye exam.    Drusen (degenerative) of macula, qbxhztrfxI35.363  History of repair of retinal tear by laser xjznsbzxdvqqyrabN78.890  Retinal hole or tear, leftH33.302  Vitreo-retinal bsmcmaaovG05.89  PVD (posterior vitreous detachment), both eyesH43.813  -OCT macula (3/13/24) - SS: 8/10 OD and 9/10 OS. Normal thickness and contour OU. Intact EZ OU. No edema OU. Medium to large drusen OU. 258/252. Drusen may be larger compared to 8/12/19.   -History of retinal tear s/p LR OS around 2018  -No retinal tear or detachment seen on exam. Retinal detachment symptoms discussed.   -BCVA OU - 20/20 OU. Would monitor for now. F/u 1 year for comprehensive exam with repeat OCT macula, sooner if any change in vision before then. Recommend AREDS2 (will communicate via [employer]).     Dry eyes, bilateral  -Use artificial tears 2-4 times a day (Refresh, Systane, Theratears, Genteal, Blink)    Hx of XEVMSE89.890  XhdcdseghR28.00  FrwrfqH70.10  WpwcyhhgqmdR42.209  -Patient obtained new glasses at end of December and having difficulty with Rx. Updated Rx given.   -F/u 1 year for comprehensive exam with OCT macula.

## 2024-03-13 ENCOUNTER — OFFICE VISIT (OUTPATIENT)
Dept: OPHTHALMOLOGY | Facility: CLINIC | Age: 64
End: 2024-03-13
Payer: COMMERCIAL

## 2024-03-13 DIAGNOSIS — H04.123 DRY EYES, BILATERAL: ICD-10-CM

## 2024-03-13 DIAGNOSIS — H33.302 RETINAL HOLE OR TEAR, LEFT: ICD-10-CM

## 2024-03-13 DIAGNOSIS — H52.01 HYPEROPIA OF RIGHT EYE: ICD-10-CM

## 2024-03-13 DIAGNOSIS — Z98.42 HISTORY OF YAG LASER CAPSULOTOMY OF LENS OF LEFT EYE: ICD-10-CM

## 2024-03-13 DIAGNOSIS — H26.492 PCO (POSTERIOR CAPSULAR OPACIFICATION), LEFT: ICD-10-CM

## 2024-03-13 DIAGNOSIS — H52.12 MYOPIA OF LEFT EYE: ICD-10-CM

## 2024-03-13 DIAGNOSIS — H43.813 PVD (POSTERIOR VITREOUS DETACHMENT), BOTH EYES: ICD-10-CM

## 2024-03-13 DIAGNOSIS — Z98.890 HISTORY OF LASER REFRACTIVE SURGERY: ICD-10-CM

## 2024-03-13 DIAGNOSIS — Z96.1 PSEUDOPHAKIA: ICD-10-CM

## 2024-03-13 DIAGNOSIS — H35.363 DRUSEN OF MACULA OF BOTH EYES: ICD-10-CM

## 2024-03-13 DIAGNOSIS — E11.9 CONTROLLED TYPE 2 DIABETES MELLITUS WITHOUT COMPLICATION, WITHOUT LONG-TERM CURRENT USE OF INSULIN (MULTI): ICD-10-CM

## 2024-03-13 DIAGNOSIS — Z98.890 HISTORY OF REPAIR OF RETINAL TEAR BY LASER PHOTOCOAGULATION: ICD-10-CM

## 2024-03-13 DIAGNOSIS — H43.89 VITREO-RETINAL ADHESIONS: ICD-10-CM

## 2024-03-13 DIAGNOSIS — H52.201 ASTIGMATISM OF RIGHT EYE, UNSPECIFIED TYPE: ICD-10-CM

## 2024-03-13 DIAGNOSIS — H26.491 PCO (POSTERIOR CAPSULAR OPACIFICATION), RIGHT: Primary | ICD-10-CM

## 2024-03-13 DIAGNOSIS — H52.4 PRESBYOPIA: ICD-10-CM

## 2024-03-13 PROCEDURE — 99024 POSTOP FOLLOW-UP VISIT: CPT | Performed by: OPHTHALMOLOGY

## 2024-03-13 ASSESSMENT — REFRACTION
OD_CYLINDER: -1.50
OS_AXIS: 155
OS_ADD: +2.50
OD_ADD: +2.50
OS_SPHERE: +0.50
OD_SPHERE: -0.25
OS_CYLINDER: -0.25
OD_AXIS: 105

## 2024-03-13 ASSESSMENT — REFRACTION_WEARINGRX
OD_CYLINDER: -1.75
OD_AXIS: 105
OD_ADD: +2.50
OS_SPHERE: -0.75
OS_CYLINDER: -0.25
OS_ADD: +2.50
OD_SPHERE: -0.50
OS_AXIS: 145
SPECS_TYPE: NEW GLASSES

## 2024-03-13 ASSESSMENT — ENCOUNTER SYMPTOMS
MUSCULOSKELETAL NEGATIVE: 0
ALLERGIC/IMMUNOLOGIC NEGATIVE: 0
GASTROINTESTINAL NEGATIVE: 0
PSYCHIATRIC NEGATIVE: 0
EYES NEGATIVE: 1
HEMATOLOGIC/LYMPHATIC NEGATIVE: 0
RESPIRATORY NEGATIVE: 0
CONSTITUTIONAL NEGATIVE: 0
ENDOCRINE NEGATIVE: 0
CARDIOVASCULAR NEGATIVE: 0
NEUROLOGICAL NEGATIVE: 0

## 2024-03-13 ASSESSMENT — TONOMETRY
OS_IOP_MMHG: 8
OD_IOP_MMHG: 9
IOP_METHOD: TONOPEN

## 2024-03-13 ASSESSMENT — VISUAL ACUITY
METHOD: SNELLEN - LINEAR
OD_CC: 20/20
OS_CC: 20/20

## 2024-03-13 ASSESSMENT — REFRACTION_MANIFEST
OS_CYLINDER: -0.25
OS_ADD: +2.50
OS_AXIS: 145
OD_AXIS: 105
OS_SPHERE: +0.75
OD_SPHERE: -0.50
OD_CYLINDER: -1.75
OD_ADD: +2.50

## 2024-04-22 ENCOUNTER — TELEPHONE (OUTPATIENT)
Dept: PRIMARY CARE | Facility: CLINIC | Age: 64
End: 2024-04-22

## 2024-04-22 NOTE — TELEPHONE ENCOUNTER
No needs seen sooner than-she hasn't been seen since May of 2023 by Dr. Wise-I am no longer covering for her-only 90 days from date she left

## 2024-04-22 NOTE — TELEPHONE ENCOUNTER
Patient called and scheduled an appointment with Dr. Angel on 5/21.  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  You refilled her Amlodipine and Atorvastatin in the past.  Would you be able to give her 30-days until her appointment?

## 2024-05-21 ENCOUNTER — APPOINTMENT (OUTPATIENT)
Dept: PRIMARY CARE | Facility: CLINIC | Age: 64
End: 2024-05-21
Payer: COMMERCIAL

## 2024-05-23 ENCOUNTER — OFFICE VISIT (OUTPATIENT)
Dept: RHEUMATOLOGY | Facility: CLINIC | Age: 64
End: 2024-05-23
Payer: COMMERCIAL

## 2024-05-23 VITALS
BODY MASS INDEX: 47.09 KG/M2 | OXYGEN SATURATION: 95 % | HEART RATE: 86 BPM | HEIGHT: 66 IN | DIASTOLIC BLOOD PRESSURE: 80 MMHG | WEIGHT: 293 LBS | SYSTOLIC BLOOD PRESSURE: 149 MMHG

## 2024-05-23 DIAGNOSIS — M15.9 GENERALIZED OSTEOARTHRITIS: Primary | ICD-10-CM

## 2024-05-23 DIAGNOSIS — M06.9 RHEUMATOID ARTHRITIS, INVOLVING UNSPECIFIED SITE, UNSPECIFIED WHETHER RHEUMATOID FACTOR PRESENT (MULTI): ICD-10-CM

## 2024-05-23 PROCEDURE — 3077F SYST BP >= 140 MM HG: CPT | Performed by: INTERNAL MEDICINE

## 2024-05-23 PROCEDURE — 1160F RVW MEDS BY RX/DR IN RCRD: CPT | Performed by: INTERNAL MEDICINE

## 2024-05-23 PROCEDURE — 99213 OFFICE O/P EST LOW 20 MIN: CPT | Performed by: INTERNAL MEDICINE

## 2024-05-23 PROCEDURE — 3008F BODY MASS INDEX DOCD: CPT | Performed by: INTERNAL MEDICINE

## 2024-05-23 PROCEDURE — 4010F ACE/ARB THERAPY RXD/TAKEN: CPT | Performed by: INTERNAL MEDICINE

## 2024-05-23 PROCEDURE — 3079F DIAST BP 80-89 MM HG: CPT | Performed by: INTERNAL MEDICINE

## 2024-05-23 PROCEDURE — 1159F MED LIST DOCD IN RCRD: CPT | Performed by: INTERNAL MEDICINE

## 2024-05-23 RX ORDER — CELECOXIB 200 MG/1
400 CAPSULE ORAL DAILY
Qty: 180 CAPSULE | Refills: 3 | Status: SHIPPED | OUTPATIENT
Start: 2024-05-23 | End: 2025-05-23

## 2024-05-23 RX ORDER — SULFASALAZINE 500 MG/1
TABLET ORAL
Qty: 450 TABLET | Refills: 3 | Status: SHIPPED | OUTPATIENT
Start: 2024-05-23

## 2024-05-23 NOTE — PROGRESS NOTES
Subjective   Patient ID: Belle Alarcon is a 64 y.o. female who presents for Follow-up (Annual FUV).  HPI  Patient with positive CCP negative RF rheumatoid arthritis.  Previously has been on methotrexate.  Also with history of osteoarthritis of the bilateral knees.  Also has a history of obstructive sleep apnea, Diabetes    When we last saw her in May 2023 we had her continue with sulfasalazine and Celebrex.  She has had falls.  She has difficulty bending her knees especially her right.  It does take a while for her to get up and usually needs to be assisted.  Her upper extremities are fine and they do not hurt at all.  She has been given the medication that was told that it would help her lose weight.  She is currently on metformin for metabolic syndrome.    Review of Systems   Constitutional:  Positive for fatigue.   Musculoskeletal:  Positive for arthralgias and gait problem.       Objective   Physical Exam  GEN: NAD A&O x3 appropriate affect cane  EYES: no conjunctival redness, eyelids normal  LYMPH: no cervical lymphadenopathy  SKIN: no rashes, psoriasis or nail pitting  PULSES: +radials  TENDER POINTS: 0/18   MSK:   no swelling DIP PIP no swelling in elbows good range of motion of shoulders  Edema in the lower extremities with crepitus in the bilateral knees and hypertrophy.  Kyphosis of the thoracic spine   Assessment/Plan           Rheumatoid arthritis -RF +CCP previously had been on methotrexate and uncertain of its effect.   Currently on sulfasalazine have her continue.  Her upper extremities do not show any evidence of synovitis.      Osteoarthritis of the bilateral knees currently on Celebrex.  No GI side effects.  Patient has been aware of possible cardiovascular side effects     She is establishing with new PCP and will have blood work next month.     We can see her back again in 1 year or as needed.   Carrol Ashford MD 05/23/24 3:51 PM

## 2024-05-24 ASSESSMENT — ENCOUNTER SYMPTOMS
ARTHRALGIAS: 1
FATIGUE: 1

## 2024-05-29 PROBLEM — Z12.39 BREAST CANCER SCREENING, HIGH RISK PATIENT: Status: ACTIVE | Noted: 2024-05-29

## 2024-05-29 NOTE — PROGRESS NOTES
Belle Alarcon female   1960 64 y.o.   [id number]           [facility]  Belle Alarcon is a 64 y.o.  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  she had bilateral elective breast reduction with a focus atypical ductal hyperplasia (ADH). She declined tamoxifen therapy. She has known left axillary sebaceous cyst. She has no family history of breast cancer.     She has comorbid health conditions including morbid obesity, hypertension, RA, OA, fatty liver, diabetes, obstructive sleep apnea. She walks with cane & struggles with her weight. She needs knee replacement but she has to loose weight for the surgery. She uses a scooter at work and struggles to walk with walker. She wants to continue care with [facility] in high risk clinic.      BREAST IMAGIN2023 bilateral screening mammogram, BI-RADS Category 0, left focal asymmetry. 3023 left diagnostic mammogram, BI-RADS Category 2, asymmetry resolved. No MRIs completed, declined.      REPRODUCTIVE HISTORY: menarche age 11, , first birth 25, , previous OCPs,menopausal age unknown, unknown, single focus of atypical ductal hyperplasia found on bilateral breast reductions in , scattered breast tissue     FAMILY CANCER HISTORY:   Father: colon cancer age 69 & basal cell skin cancer   Sister: lung cancer          REVIEW OF SYSTEMS    Constitutional:  Negative for appetite change, fatigue, fever and unexpected weight change.   HENT:  Negative for ear pain, hearing loss, nosebleeds, sore throat and trouble swallowing.    Eyes:  Negative for discharge, itching and visual disturbance.   Respiratory:  Negative for cough, chest tightness and shortness of breath.    Cardiovascular:  Negative for chest pain, palpitations and leg swelling.   Breast: as indicated in HPI  Gastrointestinal:  Negative for abdominal pain, constipation, diarrhea and nausea.   Endocrine: Negative for  cold intolerance and heat intolerance.   Genitourinary:  Negative for dysuria, frequency, hematuria, pelvic pain and vaginal bleeding.   Musculoskeletal:  Negative for arthralgias, back pain, gait problem, joint swelling and myalgias.   Skin:  Negative for color change and rash.   Allergic/Immunologic: Negative for environmental allergies and food allergies.   Neurological:  Negative for dizziness, tremors, speech difficulty, weakness, numbness and headaches.   Hematological:  Does not bruise/bleed easily.   Psychiatric/Behavioral:  Negative for agitation, dysphoric mood and sleep disturbance. The patient is not nervous/anxious.         MEDICATIONS  Current Outpatient Medications   Medication Instructions    acetaminophen (Tylenol) 500 mg tablet oral, 4 times daily    amLODIPine (NORVASC) 7.5 mg, oral, Daily    aspirin 81 mg capsule 1 tablet, oral, Daily    atorvastatin (LIPITOR) 20 mg, oral, Daily, as directed    buPROPion XL (Wellbutrin XL) 300 mg 24 hr tablet 1 tablet, oral, Daily    CALCIUM CITRATE-VITAMIN D3 ORAL 1 tablet, oral, Daily    celecoxib (CELEBREX) 400 mg, oral, Daily    cholecalciferol (Vitamin D-3) 25 MCG (1000 UT) tablet oral    folic acid (Folvite) 1 mg tablet 1 tablet, oral, Daily    furosemide (Lasix) 20 mg tablet TAKE 1 TABLET BY MOUTH EVERY DAY AS DIRECTED    levonorgestrel (Mirena) 21 mcg/24 hours (8 yrs) 52 mg IUD intrauterine    lisinopril 40 mg, oral, Daily    metaxalone (Skelaxin) 800 mg tablet oral, 2 times daily    metFORMIN XR (GLUCOPHAGE-XR) 500 mg, oral, Daily, as directed    quinapril (ACCUPRIL) 40 mg, oral, Daily RT    sulfaSALAzine (Azulfidine) 500 mg tablet Take 3 tablets by mouth every morning and 2 tablets by mouth every evening.    trospium (Sanctura XR) 60 mg 24 hour capsule 1 capsule, oral, Daily    vit C,C-Oj-gnfga-lutein-zeaxan (PreserVision AREDS-2) 250-90-40-1 mg capsule 1 capsule, oral, 2 times daily        ALLERGIES  No Known Allergies     SOCIAL HISTORY    No family  history on file.      Social History     Tobacco Use    Smoking status: Never    Smokeless tobacco: Never   Substance Use Topics    Alcohol use: Yes     Comment: rare        VITALS  There were no vitals filed for this visit.     PHYSICAL EXAM  Patient is alert and oriented x3, with appropriate mood. The gait is steady and hand grasps are equal. Sclera clear. The breasts are nearly symmetrical. The tissue is soft without palpable abnormalities, discrete nodules or masses. The skin and nipples appear normal. There is no cervical, supraclavicular, or axillary lymphadenopathy palpable. Heart rate and rhythm normal, S1 and S2 appreciated. The lungs are clear bilaterally. Abdomen is soft & non-tender.    Physical Exam  Chest:              IMAGING    Time was spent viewing digital images of the radiology testing with the patient. I explained the results in depth, along with suggested explanation for follow up recommendations based on the testing results.      RISK PROFILE          ORDERS  No orders of the defined types were placed in this encounter.          ASSESSMENT/PLAN  1. Breast cancer screening, high risk patient             No follow-ups on file.    HIGH RISK PLAN  Yearly mammogram with digital breast tomosynthesis  Twice yearly clinical breast examinations  Breast MRI (to schedule call [phone])  Monthly self breast examinations &/or regular self breast awareness  Vitamin D3 2000 IU/daily (over the counter) unless your PCP recommends you take a specific dose  Exercise 3-4 times per week for 45-60 minutes  Limit alcohol to 3-4 drinks per week if you are menopausal  Eat healthy low-fat diet with lots of vegetable & fruits  Risk models indicate personal risk of breast cancer in the next 5 years and lifetime (age 85-90):  Breast Cancer Risk Assessment Tool (Gricelda): 5-year risk 4.3% (average 2.1%), lifetime risk 14.7% (average 7.5%).   [place]-Blossom: 5-year risk 9.7% (average 1.8%), lifetime risk 26.8%, (average  5.5%)    She is eligible for endocrine therapy with Tamoxifen, and also Raloxifene or Aromatase inhibitor if/when menopausal. Endocrine therapy reduces lifetime risk of breast cancer by 50% when taken for 5 years. There is an alternative low dose Tamoxifen which can be taken for only 3 years.      Jenny Winston, [facility] [place]

## 2024-06-03 ENCOUNTER — APPOINTMENT (OUTPATIENT)
Dept: RADIOLOGY | Facility: CLINIC | Age: 64
End: 2024-06-03
Payer: COMMERCIAL

## 2024-06-03 ENCOUNTER — APPOINTMENT (OUTPATIENT)
Dept: PRIMARY CARE | Facility: CLINIC | Age: 64
End: 2024-06-03
Payer: COMMERCIAL

## 2024-06-03 ENCOUNTER — APPOINTMENT (OUTPATIENT)
Dept: SURGICAL ONCOLOGY | Facility: CLINIC | Age: 64
End: 2024-06-03
Payer: COMMERCIAL

## 2024-06-03 DIAGNOSIS — Z12.39 BREAST CANCER SCREENING, HIGH RISK PATIENT: Primary | ICD-10-CM

## 2024-06-10 ENCOUNTER — APPOINTMENT (OUTPATIENT)
Dept: PRIMARY CARE | Facility: CLINIC | Age: 64
End: 2024-06-10
Payer: COMMERCIAL

## 2024-06-19 ENCOUNTER — APPOINTMENT (OUTPATIENT)
Dept: PRIMARY CARE | Facility: CLINIC | Age: 64
End: 2024-06-19
Payer: COMMERCIAL

## 2024-06-19 RX ORDER — ATORVASTATIN CALCIUM 40 MG/1
1 TABLET, FILM COATED ORAL
COMMUNITY
Start: 2024-04-24

## 2024-07-08 NOTE — PROGRESS NOTES
Belle Alarcon female   1960 64 y.o.   [id number]           [facility]  Belle Alarcon is a 64 y.o. 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  she had bilateral elective breast reduction with a focus atypical ductal hyperplasia (ADH). She declined tamoxifen therapy. She has known left axillary sebaceous cyst. She has no family history of breast cancer.    She has comorbid health conditions including morbid obesity, hypertension, RA, OA, fatty liver, diabetes, obstructive sleep apnea. She walks with cane & struggles with her weight. She needs knee replacement but she has to loose weight for the surgery. She uses a scooter at work and struggles to walk with walker. She wants to continue care with [facility] in high risk clinic.     BREAST IMAGIN2023 screening mammogram, BI-RADS Category 0, left focal asymmetry. 2023 left diagnostic mammogram focal asymmetry resolved. No MRIs completed, declined.     REPRODUCTIVE HISTORY: menarche age 11, , first birth 25, , previous OCPs,menopausal age unknown, unknown, single focus of atypical ductal hyperplasia found on bilateral breast reductions in , scattered breast tissue    FAMILY CANCER HISTORY:   Father: colon cancer age 69 & basal cell skin cancer   Sister: lung cancer        REVIEW OF SYSTEMS    Constitutional:  Negative for appetite change, fatigue, fever and unexpected weight change.   HENT:  Negative for ear pain, hearing loss, nosebleeds, sore throat and trouble swallowing.    Eyes:  Negative for discharge, itching and visual disturbance.   Respiratory:  Negative for cough, chest tightness and shortness of breath.    Cardiovascular:  Negative for chest pain, palpitations and leg swelling.   Breast: as indicated in HPI  Gastrointestinal:  Negative for abdominal pain, constipation, diarrhea and nausea.   Endocrine: Negative for cold intolerance and heat intolerance.    Genitourinary:  Negative for dysuria, frequency, hematuria, pelvic pain and vaginal bleeding.   Musculoskeletal:  Negative for arthralgias, back pain, gait problem, joint swelling and myalgias.   Skin:  Negative for color change and rash.   Allergic/Immunologic: Negative for environmental allergies and food allergies.   Neurological:  Negative for dizziness, tremors, speech difficulty, weakness, numbness and headaches.   Hematological:  Does not bruise/bleed easily.   Psychiatric/Behavioral:  Negative for agitation, dysphoric mood and sleep disturbance. The patient is not nervous/anxious.         MEDICATIONS  Current Outpatient Medications   Medication Instructions    acetaminophen (Tylenol) 500 mg tablet oral, 4 times daily    amLODIPine (NORVASC) 7.5 mg, oral, Daily    aspirin 81 mg capsule 1 tablet, oral, Daily    atorvastatin (Lipitor) 40 mg tablet 1 tablet, oral, Daily (0630)    atorvastatin (LIPITOR) 20 mg, oral, Daily, as directed    buPROPion XL (Wellbutrin XL) 300 mg 24 hr tablet 1 tablet, oral, Daily    CALCIUM CITRATE-VITAMIN D3 ORAL 1 tablet, oral, Daily    celecoxib (CELEBREX) 400 mg, oral, Daily    cholecalciferol (Vitamin D-3) 25 MCG (1000 UT) tablet oral    folic acid (Folvite) 1 mg tablet 1 tablet, oral, Daily    furosemide (Lasix) 20 mg tablet TAKE 1 TABLET BY MOUTH EVERY DAY AS DIRECTED    levonorgestrel (Mirena) 21 mcg/24 hours (8 yrs) 52 mg IUD intrauterine    lisinopril 40 mg, oral, Daily    metaxalone (Skelaxin) 800 mg tablet oral, 2 times daily    metFORMIN XR (GLUCOPHAGE-XR) 500 mg, oral, Daily, as directed    quinapril (ACCUPRIL) 40 mg, oral, Daily RT    sulfaSALAzine (Azulfidine) 500 mg tablet Take 3 tablets by mouth every morning and 2 tablets by mouth every evening.    trospium (Sanctura XR) 60 mg 24 hour capsule 1 capsule, oral, Daily    vit C,O-Lk-jjkur-lutein-zeaxan (PreserVision AREDS-2) 250-90-40-1 mg capsule 1 capsule, oral, 2 times daily        ALLERGIES  No Known Allergies      SOCIAL HISTORY    No family history on file.      Social History     Tobacco Use    Smoking status: Never    Smokeless tobacco: Never   Substance Use Topics    Alcohol use: Yes     Comment: rare        VITALS  There were no vitals filed for this visit.     PHYSICAL EXAM  Patient is alert and oriented x3, in a relaxed, appropriate mood. She is morbidly obese. Her gait is slow & steady with cane and hand grasps are equal. She could not get on exam table for exam and it was performed in chair. Sclera clear. The breasts are full pendulous and nearly symmetrical. She has a well-healed bilateral pedicle (Wise) reduction incisions. The tissue of both breast is soft throughout with no palpable abnormalities, discrete nodules or masses. Left axilla has a noninfected sebaceous cyst noted with scant exudate. The nipples are essential normal. There is no cervical, supraclavicular, or axillary lymphadenopathy palpable. Heart rate and rhythm normal, S1 and S2 appreciated. The lungs are clear to auscultation bilaterally. Abdomen is soft, nontender, obese.        Physical Exam  Chest:              IMAGING    Time was spent viewing digital images of the radiology testing with the patient. I explained the results in depth, along with suggested explanation for follow up recommendations based on the testing results.      RISK PROFILE        ORDERS  No orders of the defined types were placed in this encounter.          ASSESSMENT/PLAN  No diagnosis found.     No follow-ups on file.    HIGH RISK PLAN  Yearly mammogram with digital breast tomosynthesis  Twice yearly clinical breast examinations  Breast MRI (to schedule call [phone])  Monthly self breast examinations &/or regular self breast awareness  Vitamin D3 2000 IU/daily (over the counter) unless your PCP recommends you take a specific dose  Exercise 3-4 times per week for 45-60 minutes  Limit alcohol to 3-4 drinks per week if you are menopausal  Eat healthy low-fat diet  with lots of vegetable & fruits  Risk models indicate personal risk of breast cancer in the next 5 years and lifetime (age 85-90):  Breast Cancer Risk Assessment Tool (Gricelda): 5-year risk 4.3% (average 2.1%), lifetime risk 14.7% (average 7.5%).   Anat: 5-year risk 8% (average 1.8%), lifetime risk 22.6%, (average 5.5%)    She is eligible for endocrine therapy with Tamoxifen, and also Raloxifene or Aromatase inhibitor if/when menopausal. Endocrine therapy reduces lifetime risk of breast cancer by 50% when taken for 5 years. There is an alternative low dose Tamoxifen which can be taken for only 3 years.      Jenny Winston, APRRADHARehabilitation Hospital of [facility]

## 2024-07-09 ENCOUNTER — APPOINTMENT (OUTPATIENT)
Dept: RADIOLOGY | Facility: CLINIC | Age: 64
End: 2024-07-09
Payer: COMMERCIAL

## 2024-07-09 ENCOUNTER — APPOINTMENT (OUTPATIENT)
Dept: SURGICAL ONCOLOGY | Facility: CLINIC | Age: 64
End: 2024-07-09
Payer: COMMERCIAL

## 2024-07-25 ENCOUNTER — OFFICE VISIT (OUTPATIENT)
Dept: SLEEP MEDICINE | Facility: HOSPITAL | Age: 64
End: 2024-07-25
Payer: COMMERCIAL

## 2024-07-25 ENCOUNTER — LAB (OUTPATIENT)
Dept: LAB | Facility: LAB | Age: 64
End: 2024-07-25
Payer: COMMERCIAL

## 2024-07-25 ENCOUNTER — OFFICE VISIT (OUTPATIENT)
Dept: PRIMARY CARE | Facility: CLINIC | Age: 64
End: 2024-07-25
Payer: COMMERCIAL

## 2024-07-25 VITALS
TEMPERATURE: 97.7 F | SYSTOLIC BLOOD PRESSURE: 139 MMHG | OXYGEN SATURATION: 95 % | DIASTOLIC BLOOD PRESSURE: 82 MMHG | HEART RATE: 80 BPM

## 2024-07-25 VITALS
RESPIRATION RATE: 18 BRPM | TEMPERATURE: 98 F | SYSTOLIC BLOOD PRESSURE: 155 MMHG | DIASTOLIC BLOOD PRESSURE: 80 MMHG | OXYGEN SATURATION: 98 % | HEART RATE: 79 BPM | HEIGHT: 66 IN | BODY MASS INDEX: 62.11 KG/M2

## 2024-07-25 DIAGNOSIS — Z01.419 WELL WOMAN EXAM WITH ROUTINE GYNECOLOGICAL EXAM: ICD-10-CM

## 2024-07-25 DIAGNOSIS — E78.00 HYPERCHOLESTEROLEMIA: ICD-10-CM

## 2024-07-25 DIAGNOSIS — E11.9 CONTROLLED TYPE 2 DIABETES MELLITUS WITHOUT COMPLICATION, WITHOUT LONG-TERM CURRENT USE OF INSULIN (MULTI): ICD-10-CM

## 2024-07-25 DIAGNOSIS — K76.0 FATTY LIVER DISEASE, NONALCOHOLIC: ICD-10-CM

## 2024-07-25 DIAGNOSIS — E66.01 MORBID OBESITY (MULTI): ICD-10-CM

## 2024-07-25 DIAGNOSIS — I10 PRIMARY HYPERTENSION: ICD-10-CM

## 2024-07-25 DIAGNOSIS — M15.9 GENERALIZED OSTEOARTHRITIS: ICD-10-CM

## 2024-07-25 DIAGNOSIS — E55.9 VITAMIN D DEFICIENCY: ICD-10-CM

## 2024-07-25 DIAGNOSIS — M06.9 RHEUMATOID ARTHRITIS, INVOLVING UNSPECIFIED SITE, UNSPECIFIED WHETHER RHEUMATOID FACTOR PRESENT (MULTI): ICD-10-CM

## 2024-07-25 DIAGNOSIS — Z76.89 ENCOUNTER TO ESTABLISH CARE: Primary | ICD-10-CM

## 2024-07-25 DIAGNOSIS — M06.09 RHEUMATOID ARTHRITIS OF MULTIPLE SITES WITH NEGATIVE RHEUMATOID FACTOR (MULTI): ICD-10-CM

## 2024-07-25 DIAGNOSIS — Z30.430 ENCOUNTER FOR INTRAUTERINE DEVICE PLACEMENT: ICD-10-CM

## 2024-07-25 DIAGNOSIS — G47.33 OSA (OBSTRUCTIVE SLEEP APNEA): Primary | ICD-10-CM

## 2024-07-25 LAB
25(OH)D3 SERPL-MCNC: 62 NG/ML (ref 30–100)
ALBUMIN SERPL BCP-MCNC: 4 G/DL (ref 3.4–5)
ALP SERPL-CCNC: 136 U/L (ref 33–136)
ALT SERPL W P-5'-P-CCNC: 13 U/L (ref 7–45)
ANION GAP SERPL CALC-SCNC: 12 MMOL/L (ref 10–20)
AST SERPL W P-5'-P-CCNC: 14 U/L (ref 9–39)
BILIRUB SERPL-MCNC: 0.5 MG/DL (ref 0–1.2)
BUN SERPL-MCNC: 14 MG/DL (ref 6–23)
CALCIUM SERPL-MCNC: 9.2 MG/DL (ref 8.6–10.6)
CHLORIDE SERPL-SCNC: 106 MMOL/L (ref 98–107)
CHOLEST SERPL-MCNC: 121 MG/DL (ref 0–199)
CHOLESTEROL/HDL RATIO: 2.2
CO2 SERPL-SCNC: 29 MMOL/L (ref 21–32)
CREAT SERPL-MCNC: 0.65 MG/DL (ref 0.5–1.05)
CRP SERPL-MCNC: 2.01 MG/DL
EGFRCR SERPLBLD CKD-EPI 2021: >90 ML/MIN/1.73M*2
ERYTHROCYTE [DISTWIDTH] IN BLOOD BY AUTOMATED COUNT: 14.8 % (ref 11.5–14.5)
ERYTHROCYTE [SEDIMENTATION RATE] IN BLOOD BY WESTERGREN METHOD: 19 MM/H (ref 0–30)
EST. AVERAGE GLUCOSE BLD GHB EST-MCNC: 131 MG/DL
GLUCOSE SERPL-MCNC: 97 MG/DL (ref 74–99)
HBA1C MFR BLD: 6.2 %
HCT VFR BLD AUTO: 37 % (ref 36–46)
HDLC SERPL-MCNC: 53.8 MG/DL
HGB BLD-MCNC: 11.3 G/DL (ref 12–16)
LDLC SERPL CALC-MCNC: 52 MG/DL
MCH RBC QN AUTO: 29.4 PG (ref 26–34)
MCHC RBC AUTO-ENTMCNC: 30.5 G/DL (ref 32–36)
MCV RBC AUTO: 96 FL (ref 80–100)
NON HDL CHOLESTEROL: 67 MG/DL (ref 0–149)
NRBC BLD-RTO: 0 /100 WBCS (ref 0–0)
PLATELET # BLD AUTO: 202 X10*3/UL (ref 150–450)
POTASSIUM SERPL-SCNC: 3.9 MMOL/L (ref 3.5–5.3)
PROT SERPL-MCNC: 6.6 G/DL (ref 6.4–8.2)
RBC # BLD AUTO: 3.85 X10*6/UL (ref 4–5.2)
SODIUM SERPL-SCNC: 143 MMOL/L (ref 136–145)
TRIGL SERPL-MCNC: 77 MG/DL (ref 0–149)
TSH SERPL-ACNC: 0.91 MIU/L (ref 0.44–3.98)
VLDL: 15 MG/DL (ref 0–40)
WBC # BLD AUTO: 6.9 X10*3/UL (ref 4.4–11.3)

## 2024-07-25 PROCEDURE — 84443 ASSAY THYROID STIM HORMONE: CPT

## 2024-07-25 PROCEDURE — 3079F DIAST BP 80-89 MM HG: CPT | Performed by: NURSE PRACTITIONER

## 2024-07-25 PROCEDURE — 3044F HG A1C LEVEL LT 7.0%: CPT | Performed by: NURSE PRACTITIONER

## 2024-07-25 PROCEDURE — 3048F LDL-C <100 MG/DL: CPT | Performed by: NURSE PRACTITIONER

## 2024-07-25 PROCEDURE — 1125F AMNT PAIN NOTED PAIN PRSNT: CPT | Performed by: NURSE PRACTITIONER

## 2024-07-25 PROCEDURE — 3075F SYST BP GE 130 - 139MM HG: CPT | Performed by: INTERNAL MEDICINE

## 2024-07-25 PROCEDURE — 1159F MED LIST DOCD IN RCRD: CPT | Performed by: INTERNAL MEDICINE

## 2024-07-25 PROCEDURE — 85027 COMPLETE CBC AUTOMATED: CPT

## 2024-07-25 PROCEDURE — 99204 OFFICE O/P NEW MOD 45 MIN: CPT | Performed by: NURSE PRACTITIONER

## 2024-07-25 PROCEDURE — 3079F DIAST BP 80-89 MM HG: CPT | Performed by: INTERNAL MEDICINE

## 2024-07-25 PROCEDURE — 82306 VITAMIN D 25 HYDROXY: CPT

## 2024-07-25 PROCEDURE — 36415 COLL VENOUS BLD VENIPUNCTURE: CPT

## 2024-07-25 PROCEDURE — 99214 OFFICE O/P EST MOD 30 MIN: CPT | Performed by: INTERNAL MEDICINE

## 2024-07-25 PROCEDURE — 86140 C-REACTIVE PROTEIN: CPT

## 2024-07-25 PROCEDURE — 1160F RVW MEDS BY RX/DR IN RCRD: CPT | Performed by: NURSE PRACTITIONER

## 2024-07-25 PROCEDURE — 1125F AMNT PAIN NOTED PAIN PRSNT: CPT | Performed by: INTERNAL MEDICINE

## 2024-07-25 PROCEDURE — 4010F ACE/ARB THERAPY RXD/TAKEN: CPT | Performed by: INTERNAL MEDICINE

## 2024-07-25 PROCEDURE — 1159F MED LIST DOCD IN RCRD: CPT | Performed by: NURSE PRACTITIONER

## 2024-07-25 PROCEDURE — 4010F ACE/ARB THERAPY RXD/TAKEN: CPT | Performed by: NURSE PRACTITIONER

## 2024-07-25 PROCEDURE — 80061 LIPID PANEL: CPT

## 2024-07-25 PROCEDURE — 1036F TOBACCO NON-USER: CPT | Performed by: INTERNAL MEDICINE

## 2024-07-25 PROCEDURE — 83036 HEMOGLOBIN GLYCOSYLATED A1C: CPT

## 2024-07-25 PROCEDURE — 3077F SYST BP >= 140 MM HG: CPT | Performed by: NURSE PRACTITIONER

## 2024-07-25 PROCEDURE — G2211 COMPLEX E/M VISIT ADD ON: HCPCS | Performed by: INTERNAL MEDICINE

## 2024-07-25 PROCEDURE — 85652 RBC SED RATE AUTOMATED: CPT

## 2024-07-25 PROCEDURE — 80053 COMPREHEN METABOLIC PANEL: CPT

## 2024-07-25 PROCEDURE — 99214 OFFICE O/P EST MOD 30 MIN: CPT | Performed by: NURSE PRACTITIONER

## 2024-07-25 RX ORDER — CHOLECALCIFEROL (VITAMIN D3) 50 MCG
2000 TABLET ORAL 3 TIMES DAILY
Qty: 90 TABLET | Refills: 0 | Status: SHIPPED | OUTPATIENT
Start: 2024-07-25

## 2024-07-25 RX ORDER — ATORVASTATIN CALCIUM 40 MG/1
40 TABLET, FILM COATED ORAL DAILY
Qty: 90 TABLET | Refills: 1 | Status: SHIPPED | OUTPATIENT
Start: 2024-07-25 | End: 2024-07-25 | Stop reason: SDUPTHER

## 2024-07-25 SDOH — ECONOMIC STABILITY: FOOD INSECURITY: WITHIN THE PAST 12 MONTHS, THE FOOD YOU BOUGHT JUST DIDN'T LAST AND YOU DIDN'T HAVE MONEY TO GET MORE.: NEVER TRUE

## 2024-07-25 SDOH — ECONOMIC STABILITY: FOOD INSECURITY: WITHIN THE PAST 12 MONTHS, YOU WORRIED THAT YOUR FOOD WOULD RUN OUT BEFORE YOU GOT MONEY TO BUY MORE.: NEVER TRUE

## 2024-07-25 ASSESSMENT — PATIENT HEALTH QUESTIONNAIRE - PHQ9
2. FEELING DOWN, DEPRESSED OR HOPELESS: NOT AT ALL
SUM OF ALL RESPONSES TO PHQ9 QUESTIONS 1 AND 2: 0
1. LITTLE INTEREST OR PLEASURE IN DOING THINGS: NOT AT ALL

## 2024-07-25 ASSESSMENT — COLUMBIA-SUICIDE SEVERITY RATING SCALE - C-SSRS
2. HAVE YOU ACTUALLY HAD ANY THOUGHTS OF KILLING YOURSELF?: NO
1. IN THE PAST MONTH, HAVE YOU WISHED YOU WERE DEAD OR WISHED YOU COULD GO TO SLEEP AND NOT WAKE UP?: NO
6. HAVE YOU EVER DONE ANYTHING, STARTED TO DO ANYTHING, OR PREPARED TO DO ANYTHING TO END YOUR LIFE?: NO

## 2024-07-25 ASSESSMENT — LIFESTYLE VARIABLES
AUDIT-C TOTAL SCORE: 0
HOW MANY STANDARD DRINKS CONTAINING ALCOHOL DO YOU HAVE ON A TYPICAL DAY: PATIENT DOES NOT DRINK
HOW OFTEN DO YOU HAVE A DRINK CONTAINING ALCOHOL: NEVER
HOW OFTEN DO YOU HAVE SIX OR MORE DRINKS ON ONE OCCASION: NEVER
SKIP TO QUESTIONS 9-10: 1

## 2024-07-25 ASSESSMENT — ENCOUNTER SYMPTOMS
OCCASIONAL FEELINGS OF UNSTEADINESS: 1
LOSS OF SENSATION IN FEET: 0

## 2024-07-25 ASSESSMENT — PAIN SCALES - GENERAL
PAINLEVEL: 2
PAINLEVEL: 2

## 2024-07-25 NOTE — PROGRESS NOTES
Patient: Belle Alarcon    [id number]  : 1960 -- AGE 64 y.o.    Provider: Irina Inman MD     Location [facility], [place], operated by [facility]   Service Date: 2024              [facility]  New Visit Note          HISTORY OF PRESENT ILLNESS     The patient's referring provider is: No ref. provider found    HISTORY OF PRESENT ILLNESS   Belle Alarcon is a 64 y.o. female who presents to a [facility] for a sleep medicine evaluation.    The patient  has a past medical history of Cataract, Depression, Diabetes mellitus (Multi), Hypertension, Personal history of other endocrine, nutritional and metabolic disease (2014), and Unspecified benign mammary dysplasia of unspecified breast (2016)..    The referral is for an evaluation of assessment of CPAP machine.. While useable. it is making noise onstarting and has a leak around a machine gasket (not chamber).   Obstrined from [facility] in  and last supplies last year visit.    Has a hard time to sleep when she does not use PAP>       Current History    On today's visit, the patient reports good sleep on PAP    Sleep-related ROS: asleep at 11pm and up by 7am for work     Daytime Symptoms none    ESS: 3/24  CYNTHIA:4  FOSQ:  35      REVIEW OF SYSTEMS     REVIEW OF SYSTEMS  Review of Systems  Continues to work reguarly       ALLERGIES AND MEDICATIONS     ALLERGIES  No Known Allergies    MEDICATIONS  Current Outpatient Medications   Medication Sig Dispense Refill    acetaminophen (Tylenol) 500 mg tablet Take by mouth 4 times a day.      amLODIPine (Norvasc) 2.5 mg tablet Take 3 tablets (7.5 mg) by mouth once daily. 270 tablet 0    aspirin 81 mg capsule Take 1 tablet by mouth once daily.      atorvastatin (Lipitor) 40 mg tablet Take 1 tablet (40 mg) by mouth early in the morning..      buPROPion XL (Wellbutrin XL) 300 mg 24 hr tablet Take 1 tablet (300 mg) by mouth once daily.      CALCIUM  CITRATE-VITAMIN D3 ORAL Take 1 tablet by mouth once daily.      celecoxib (CeleBREX) 200 mg capsule Take 2 capsules (400 mg) by mouth once daily. 180 capsule 3    furosemide (Lasix) 20 mg tablet TAKE 1 TABLET BY MOUTH EVERY DAY AS DIRECTED (Patient taking differently: if needed.) 90 tablet 0    levonorgestrel (Mirena) 21 mcg/24 hours (8 yrs) 52 mg IUD by intrauterine route.      lisinopril 40 mg tablet Take 1 tablet (40 mg) by mouth once daily. 90 tablet 1    metaxalone (Skelaxin) 800 mg tablet Take by mouth twice a day.      metFORMIN XR (Glucophage-XR) 500 mg 24 hr tablet Take 1 tablet (500 mg) by mouth once daily. as directed (Patient taking differently: Take 750 mg by mouth once daily. as directed) 90 tablet 1    sulfaSALAzine (Azulfidine) 500 mg tablet Take 3 tablets by mouth every morning and 2 tablets by mouth every evening. 450 tablet 3    trospium (Sanctura XR) 60 mg 24 hour capsule Take 1 capsule (60 mg) by mouth once daily.      cholecalciferol (Vitamin D-3) 50 MCG (2000 UT) tablet Take 1 tablet (2,000 Units) by mouth 3 times a day. (Patient not taking: Reported on 7/25/2024) 90 tablet 0    vit C,O-Oi-grobu-lutein-zeaxan (PreserVision AREDS-2) 250-90-40-1 mg capsule Take 1 capsule by mouth 2 times a day.       No current facility-administered medications for this visit.         PAST HISTORY     PAST MEDICAL HISTORY  She  has a past medical history of Cataract, Depression, Diabetes mellitus (Multi), Hypertension, Personal history of other endocrine, nutritional and metabolic disease (05/29/2014), and Unspecified benign mammary dysplasia of unspecified breast (11/02/2016).       PAST SURGICAL HISTORY:  Past Surgical History:   Procedure Laterality Date    BREAST SURGERY  05/29/2014    Breast Surgery Reduction Procedure    CATARACT EXTRACTION  06/20/2017    Cataract Surgery    DILATION AND CURETTAGE OF UTERUS  12/11/2018    Dilation And Curettage    OTHER SURGICAL HISTORY  12/11/2018    Colonoscopy    OTHER  SURGICAL HISTORY  06/02/2020    Intrauterine device placement    REFRACTIVE SURGERY  05/29/2014    Corneal LASIK    TONSILLECTOMY  11/14/2017    Tonsillectomy With Adenoidectomy       FAMILY HISTORY  Family History   Problem Relation Name Age of Onset    Heart disease Mother      COPD Mother      Cancer Father           SOCIAL HISTORY  She  reports that she has never smoked. She has never used smokeless tobacco. She reports current alcohol use. She reports that she does not use drugs.       PHYSICAL EXAM     VITAL SIGNS: /82   Pulse 80   Temp 36.5 °C (97.7 °F)   SpO2 95% Comment: RA     CURRENT WEIGHT: [unfilled]  BMI: [unfilled]  PREVIOUS WEIGHTS:  Wt Readings from Last 3 Encounters:   05/23/24 (!) 175 kg (384 lb 12.8 oz)   01/10/23 (!) 175 kg (384 lb 12.8 oz)   11/16/21 (!) 175 kg (385 lb 6 oz)       Physical Exam  Patent nose and throat       RESULTS/DATA     Iron (ug/dL)   Date Value   06/22/2021 80     Iron Saturation (%)   Date Value   06/22/2021 23 (L)     TIBC (ug/dL)   Date Value   06/22/2021 354     Ferritin (ug/L)   Date Value   06/22/2021 102           ASSESSMENT/PLAN     Ms. Alarcon is a 64 y.o. female and  has a past medical history of Cataract, Depression, Diabetes mellitus (Multi), Hypertension, Personal history of other endocrine, nutritional and metabolic disease (05/29/2014), and Unspecified benign mammary dysplasia of unspecified breast (11/02/2016). She was referred to the [facility] for evaluation of replacement of the machine.     We do not have the former 2015 sleep study. Pordered a repeat study HST to justify a new machine     Problem List and Orders  Diagnoses and all orders for this visit:  OLIVIA (obstructive sleep apnea)  -     Home sleep apnea test (HSAT); Future

## 2024-07-25 NOTE — PATIENT INSTRUCTIONS
New patient - establish care   Diabetes  Fasting labs to be obtained.   Recently started on Acarbose for weight loss and diabetes control  Morbid obesity  Referred to ti bruner.   She declines physical therapy and bariatric surgery referral at this time.      Needs possible replacement of iud -- formerly done under anesthesia   patient states tipped uterus made insertion difficult,   Referred to gynecology for further evaluation.    RA - labs to be obtained.   She will be notified of results as they become available.      Follow up in the next few weeks -- review labs and yearly physical exam

## 2024-07-25 NOTE — PROGRESS NOTES
Subjective   Patient ID: Belle Alarcon is a 64 y.o. female who presents for New Patient Visit (Patient presents as new patient visit to establish new PCP care./) and Establish Care.  HPI64 y.o. female with past medical history of hypercholesterolemia, hypertension, type 2 diabetes mellitis, vit d deficiency, PCO, PVD, history of retinal tear- followed by specialist, colon polyps, fatty liver, mixed stress an urge incontinence of urine, iron deficiency anemia, OA, OLIVIA and morbid obesity presents today to establish care.     RA - followed by rheumatology Dr. Ashford - on long term methotrexate use -off for past year --off folic acid as well    Dr. Inman - sleep disorders - bipap machine - wears nightly.    Mirena - 5-6 years -- Dr. Ortiz following hyperplasia      Jenny NORMAN  - Breast cancer --found during breast reduction - in situ  October 21, mammogram    Review of Systems  Review of systems: Present-feeling well. Not present-chills, fatigue and fever.  Skin: Not present-new lesions and rash.  HEENT:See HPI.   Not present-headache, ear pain, seasonal allergies, nasal congestion, and sore throat.  Neck: Not present-neck pain, neck stiffness and swollen glands.  Respiratory: Not present-difficulty breathing, cough, bloody sputum.  Cardiovascular: Not present-abnormal blood pressure, chest pain, edema, palpitations, dyspnea on exertion.  Gastrointestinal: Not present-abdominal pain, bloody or very black stools, heartburn, jaundice, nausea and vomiting.  Genitourinary: Not present-change in bladder habits, hematuria, flank pain and dysuria.  Musculoskeletal: RA. Physical deconditioning and morbid obesity.  Not present- joint swelling, joint redness.  Neurological: Not present-dizziness, headache, numbness or tingling.  Psychiatric:She is tearful and depressed.  Physical deconditioning is affecting her overall feeling of well being.   Not present-panic attacks, suicidal ideation, suicidal planning, thoughts  "of hurting others and thoughts of self-harm.  Endocrine: Not present-appetite changes, polydipsia, polyuria, and thyroid problems.  Hematology:  Not present-anemia, excessive bleeding, bruising and epistaxis.    Objective   /80 (BP Location: Right arm, Patient Position: Sitting, BP Cuff Size: Adult)   Pulse 79   Temp 36.7 °C (98 °F) (Temporal)   Resp 18   Ht 1.676 m (5' 6\")   SpO2 98%   BMI 62.11 kg/m²      Physical Exam  Gen.: Mental status-alert. Gen. appearance-cooperative, well groomed and consistent with stated age. Not in acute distress or sickly. Orientation-oriented to time, place, purpose and person. Build and nutrition-well-nourished and well-developed. Hydration-well-hydrated.    Integumentary: Color-normal coloration skin. Skin moisture-normal skin moisture.    Head and neck: Head-normocephalic, atraumatic with no lesions or palpable masses. Face-atraumatic. Neck-full range of motion and subtle. No lymphadenopathy and no nuchal rigidity.     Chest and lung exam:  Auscultation-normal breath sounds, no adventitious lung sounds and normal vocal resonance. Chest wall is normal in shape and non-tender.    Cardiovascular: Auscultation: Regular rate and rhythm. Heart sounds-normal heart sounds, S1-S2. No murmurs or gallops appreciated. Carotid arteries normal and without bruit.    Abdomen: Non-tender, no rigidity, and no palpable masses. There is no hepatosplenomegaly. Auscultation-auscultation of the abdomen reveals normal bowel sounds throughout.     Peripheral vascular: Lower extremity-inspection is normal bilaterally. Normal temperature and no edema bilaterally.  Assessment/Plan   Diagnoses and all orders for this visit:  Encounter to establish care  Hypercholesterolemia  -     Lipid Panel; Future  Encounter for intrauterine device placement  -     Referral to Gynecology; Future  Well woman exam with routine gynecological exam  -     Referral to Gynecology; Future  Controlled type 2 diabetes " mellitus without complication, without long-term current use of insulin (Multi)  -     CBC; Future  -     Comprehensive Metabolic Panel; Future  -     Hemoglobin A1C; Future  -     TSH with reflex to Free T4 if abnormal; Future  Primary hypertension  -     CBC; Future  -     Comprehensive Metabolic Panel; Future  Rheumatoid arthritis of multiple sites with negative rheumatoid factor (Multi)  -     CBC; Future  -     Comprehensive Metabolic Panel; Future  -     Sedimentation Rate; Future  -     C-reactive protein; Future  Fatty liver disease, nonalcoholic  -     Comprehensive Metabolic Panel; Future  Vitamin D deficiency  -     Vitamin D 25-Hydroxy,Total (for eval of Vitamin D levels); Future  Morbid obesity (Multi)  -     Referral to Haseeb Me; Future  Other orders  -     cholecalciferol (Vitamin D-3) 50 MCG (2000 UT) tablet; Take 1 tablet (2,000 Units) by mouth 3 times a day. (Patient not taking: Reported on 7/25/2024)

## 2024-07-29 ENCOUNTER — TELEPHONE (OUTPATIENT)
Dept: PRIMARY CARE | Facility: CLINIC | Age: 64
End: 2024-07-29
Payer: COMMERCIAL

## 2024-08-06 DIAGNOSIS — I10 HYPERTENSION, UNSPECIFIED TYPE: ICD-10-CM

## 2024-08-06 RX ORDER — AMLODIPINE BESYLATE 2.5 MG/1
7.5 TABLET ORAL DAILY
Qty: 270 TABLET | Refills: 0 | Status: SHIPPED | OUTPATIENT
Start: 2024-08-06

## 2024-08-07 ENCOUNTER — TELEPHONE (OUTPATIENT)
Dept: PRIMARY CARE | Facility: CLINIC | Age: 64
End: 2024-08-07
Payer: COMMERCIAL

## 2024-08-13 ENCOUNTER — TELEPHONE (OUTPATIENT)
Dept: PRIMARY CARE | Facility: CLINIC | Age: 64
End: 2024-08-13
Payer: COMMERCIAL

## 2024-08-13 DIAGNOSIS — D50.8 OTHER IRON DEFICIENCY ANEMIA: Primary | ICD-10-CM

## 2024-08-13 NOTE — TELEPHONE ENCOUNTER
Lvm: re labs  You are anemic with hemoglobin 11.3 - You do have a history of iron deficiency anemia.   I would like to do additional studies to assess your iron levels.   Orders have been placed.    Normal kidney and liver function.  Normal cholesterol panel.  hemoglobin A1c at 6.2 -improved since study 1 year ago.  Normal cholesterol panel.  Normal thyroid.  Normal vitamin D.  Elevated C-reactive protein.  Normal sed rate.-Inflammatory markers.  Please follow up with Rheumatology as previously discussed.

## 2024-09-04 DIAGNOSIS — G47.33 OSA (OBSTRUCTIVE SLEEP APNEA): Primary | ICD-10-CM

## 2024-09-19 ENCOUNTER — TELEPHONE (OUTPATIENT)
Dept: OBSTETRICS AND GYNECOLOGY | Facility: CLINIC | Age: 64
End: 2024-09-19
Payer: COMMERCIAL

## 2024-09-19 DIAGNOSIS — E78.00 HYPERCHOLESTEROLEMIA: Primary | ICD-10-CM

## 2024-09-19 RX ORDER — ATORVASTATIN CALCIUM 40 MG/1
40 TABLET, FILM COATED ORAL
Qty: 90 TABLET | Refills: 0 | Status: SHIPPED | OUTPATIENT
Start: 2024-09-19 | End: 2024-09-20 | Stop reason: SDUPTHER

## 2024-09-20 DIAGNOSIS — E78.00 HYPERCHOLESTEROLEMIA: ICD-10-CM

## 2024-09-20 RX ORDER — ATORVASTATIN CALCIUM 40 MG/1
40 TABLET, FILM COATED ORAL
Qty: 90 TABLET | Refills: 0 | Status: SHIPPED | OUTPATIENT
Start: 2024-09-20

## 2024-09-25 ENCOUNTER — APPOINTMENT (OUTPATIENT)
Dept: PRIMARY CARE | Facility: CLINIC | Age: 64
End: 2024-09-25
Payer: COMMERCIAL

## 2024-09-30 ENCOUNTER — TELEPHONE (OUTPATIENT)
Dept: SLEEP MEDICINE | Facility: HOSPITAL | Age: 64
End: 2024-09-30
Payer: COMMERCIAL

## 2024-09-30 DIAGNOSIS — G47.33 OSA (OBSTRUCTIVE SLEEP APNEA): ICD-10-CM

## 2024-09-30 NOTE — TELEPHONE ENCOUNTER
Pineda called, needed verification of PAP pressures, changed pressure support to 4 instead of 5 for settings of 18/14.
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
## 2024-10-21 ENCOUNTER — OFFICE VISIT (OUTPATIENT)
Dept: SURGICAL ONCOLOGY | Facility: CLINIC | Age: 64
End: 2024-10-21
Payer: COMMERCIAL

## 2024-10-21 ENCOUNTER — OFFICE VISIT (OUTPATIENT)
Dept: PRIMARY CARE | Facility: CLINIC | Age: 64
End: 2024-10-21
Payer: COMMERCIAL

## 2024-10-21 ENCOUNTER — HOSPITAL ENCOUNTER (OUTPATIENT)
Dept: RADIOLOGY | Facility: CLINIC | Age: 64
Discharge: HOME | End: 2024-10-21
Payer: COMMERCIAL

## 2024-10-21 ENCOUNTER — LAB (OUTPATIENT)
Dept: LAB | Facility: LAB | Age: 64
End: 2024-10-21
Payer: COMMERCIAL

## 2024-10-21 VITALS — SYSTOLIC BLOOD PRESSURE: 159 MMHG | HEART RATE: 80 BPM | DIASTOLIC BLOOD PRESSURE: 93 MMHG | TEMPERATURE: 98 F

## 2024-10-21 VITALS
TEMPERATURE: 98.3 F | RESPIRATION RATE: 18 BRPM | OXYGEN SATURATION: 94 % | DIASTOLIC BLOOD PRESSURE: 68 MMHG | HEART RATE: 96 BPM | BODY MASS INDEX: 64.2 KG/M2 | HEIGHT: 65 IN | SYSTOLIC BLOOD PRESSURE: 130 MMHG

## 2024-10-21 VITALS — WEIGHT: 293 LBS | HEIGHT: 66 IN | BODY MASS INDEX: 47.09 KG/M2

## 2024-10-21 DIAGNOSIS — Z00.00 ANNUAL PHYSICAL EXAM: ICD-10-CM

## 2024-10-21 DIAGNOSIS — Z12.31 ENCOUNTER FOR SCREENING MAMMOGRAM FOR MALIGNANT NEOPLASM OF BREAST: ICD-10-CM

## 2024-10-21 DIAGNOSIS — Z12.31 ENCOUNTER FOR SCREENING MAMMOGRAM FOR MALIGNANT NEOPLASM OF BREAST: Primary | ICD-10-CM

## 2024-10-21 DIAGNOSIS — D50.8 OTHER IRON DEFICIENCY ANEMIA: ICD-10-CM

## 2024-10-21 DIAGNOSIS — Z12.39 BREAST CANCER SCREENING, HIGH RISK PATIENT: ICD-10-CM

## 2024-10-21 DIAGNOSIS — E66.01 MORBID OBESITY WITH BODY MASS INDEX (BMI) OF 40.0 OR HIGHER (MULTI): ICD-10-CM

## 2024-10-21 DIAGNOSIS — Z97.5 IUD (INTRAUTERINE DEVICE) IN PLACE: ICD-10-CM

## 2024-10-21 DIAGNOSIS — N39.3 STRESS INCONTINENCE OF URINE: ICD-10-CM

## 2024-10-21 DIAGNOSIS — E11.9 DIABETES MELLITUS TYPE 2 WITHOUT RETINOPATHY (MULTI): ICD-10-CM

## 2024-10-21 DIAGNOSIS — Z00.00 ANNUAL PHYSICAL EXAM: Primary | ICD-10-CM

## 2024-10-21 DIAGNOSIS — I10 PRIMARY HYPERTENSION: ICD-10-CM

## 2024-10-21 DIAGNOSIS — Z13.89 SCREENING FOR BLOOD OR PROTEIN IN URINE: ICD-10-CM

## 2024-10-21 DIAGNOSIS — I10 BENIGN ESSENTIAL HYPERTENSION: ICD-10-CM

## 2024-10-21 DIAGNOSIS — Z86.018 HISTORY OF UTERINE FIBROID: ICD-10-CM

## 2024-10-21 DIAGNOSIS — Z12.11 SCREENING FOR COLON CANCER: ICD-10-CM

## 2024-10-21 LAB
CHOLEST SERPL-MCNC: 135 MG/DL (ref 0–199)
CHOLESTEROL/HDL RATIO: 2.5
ERYTHROCYTE [DISTWIDTH] IN BLOOD BY AUTOMATED COUNT: 15.4 % (ref 11.5–14.5)
HCT VFR BLD AUTO: 36.5 % (ref 36–46)
HDLC SERPL-MCNC: 53.4 MG/DL
HGB BLD-MCNC: 11.1 G/DL (ref 12–16)
IRON SATN MFR SERPL: 19 % (ref 25–45)
IRON SERPL-MCNC: 61 UG/DL (ref 35–150)
LDLC SERPL CALC-MCNC: 63 MG/DL
MCH RBC QN AUTO: 29.4 PG (ref 26–34)
MCHC RBC AUTO-ENTMCNC: 30.4 G/DL (ref 32–36)
MCV RBC AUTO: 97 FL (ref 80–100)
NON HDL CHOLESTEROL: 82 MG/DL (ref 0–149)
NRBC BLD-RTO: 0 /100 WBCS (ref 0–0)
PLATELET # BLD AUTO: 187 X10*3/UL (ref 150–450)
POC FINGERSTICK BLOOD GLUCOSE: 175 MG/DL (ref 70–100)
RBC # BLD AUTO: 3.77 X10*6/UL (ref 4–5.2)
TIBC SERPL-MCNC: 319 UG/DL (ref 240–445)
TRIGL SERPL-MCNC: 94 MG/DL (ref 0–149)
UIBC SERPL-MCNC: 258 UG/DL (ref 110–370)
VLDL: 19 MG/DL (ref 0–40)
WBC # BLD AUTO: 7.1 X10*3/UL (ref 4.4–11.3)

## 2024-10-21 PROCEDURE — 85027 COMPLETE CBC AUTOMATED: CPT

## 2024-10-21 PROCEDURE — 80061 LIPID PANEL: CPT

## 2024-10-21 PROCEDURE — 99214 OFFICE O/P EST MOD 30 MIN: CPT | Performed by: NURSE PRACTITIONER

## 2024-10-21 PROCEDURE — 36415 COLL VENOUS BLD VENIPUNCTURE: CPT

## 2024-10-21 PROCEDURE — 3048F LDL-C <100 MG/DL: CPT | Performed by: NURSE PRACTITIONER

## 2024-10-21 PROCEDURE — 1125F AMNT PAIN NOTED PAIN PRSNT: CPT | Performed by: NURSE PRACTITIONER

## 2024-10-21 PROCEDURE — 3080F DIAST BP >= 90 MM HG: CPT | Performed by: NURSE PRACTITIONER

## 2024-10-21 PROCEDURE — 3077F SYST BP >= 140 MM HG: CPT | Performed by: NURSE PRACTITIONER

## 2024-10-21 PROCEDURE — 3044F HG A1C LEVEL LT 7.0%: CPT | Performed by: NURSE PRACTITIONER

## 2024-10-21 PROCEDURE — 83550 IRON BINDING TEST: CPT

## 2024-10-21 PROCEDURE — 77067 SCR MAMMO BI INCL CAD: CPT

## 2024-10-21 PROCEDURE — 77067 SCR MAMMO BI INCL CAD: CPT | Mod: BILATERAL PROCEDURE | Performed by: STUDENT IN AN ORGANIZED HEALTH CARE EDUCATION/TRAINING PROGRAM

## 2024-10-21 PROCEDURE — 4010F ACE/ARB THERAPY RXD/TAKEN: CPT | Performed by: NURSE PRACTITIONER

## 2024-10-21 PROCEDURE — 1160F RVW MEDS BY RX/DR IN RCRD: CPT | Performed by: NURSE PRACTITIONER

## 2024-10-21 PROCEDURE — 1159F MED LIST DOCD IN RCRD: CPT | Performed by: NURSE PRACTITIONER

## 2024-10-21 PROCEDURE — 1036F TOBACCO NON-USER: CPT | Performed by: NURSE PRACTITIONER

## 2024-10-21 PROCEDURE — 83540 ASSAY OF IRON: CPT

## 2024-10-21 PROCEDURE — 83036 HEMOGLOBIN GLYCOSYLATED A1C: CPT

## 2024-10-21 PROCEDURE — 77063 BREAST TOMOSYNTHESIS BI: CPT | Mod: BILATERAL PROCEDURE | Performed by: STUDENT IN AN ORGANIZED HEALTH CARE EDUCATION/TRAINING PROGRAM

## 2024-10-21 PROCEDURE — 82962 GLUCOSE BLOOD TEST: CPT | Performed by: NURSE PRACTITIONER

## 2024-10-21 RX ORDER — METFORMIN HYDROCHLORIDE 850 MG/1
850 TABLET ORAL
Qty: 180 TABLET | Refills: 1 | Status: SHIPPED | OUTPATIENT
Start: 2024-10-21 | End: 2025-10-21

## 2024-10-21 ASSESSMENT — PATIENT HEALTH QUESTIONNAIRE - PHQ9
2. FEELING DOWN, DEPRESSED OR HOPELESS: NOT AT ALL
1. LITTLE INTEREST OR PLEASURE IN DOING THINGS: NOT AT ALL
SUM OF ALL RESPONSES TO PHQ9 QUESTIONS 1 & 2: 0

## 2024-10-21 ASSESSMENT — ENCOUNTER SYMPTOMS
DEPRESSION: 0
LOSS OF SENSATION IN FEET: 0
OCCASIONAL FEELINGS OF UNSTEADINESS: 1

## 2024-10-21 ASSESSMENT — PAIN SCALES - GENERAL
PAINLEVEL_OUTOF10: 8
PAINLEVEL_OUTOF10: 8

## 2024-10-21 NOTE — PATIENT INSTRUCTIONS
Annual exam with fasting labs to be obtained.   Diabetes - not testing blood sugars.   She was encouraged to do so.   Contact office with blood sugars > 200  and < 80.   Patient states she is currently taking Metformin 850 mg bid - refill provided.   Non fasting glucose 175.  See patient education.   Referred for colonoscopy  You will be notified of results as they become available.  Stress incontinence, uterine fibroids and IUD in place - referred to Uro-gynecology for further evaluation and pap as needed.   Hypertension-Patient to continue to monitor blood pressure.  Contact office of blood pressure readings greater than 160/90 or less than 90/60.  Diet, exercise and weight loss encouraged.  See attached patient education.  Continue with current medical regime.    Follow-up in 6 months or sooner as needed.

## 2024-10-21 NOTE — PROGRESS NOTES
Subjective   Patient ID: Belle Alarcon is a 64 y.o. female who presents for Physical (Pt. Here for a physical./Pt. Strained left hip today. No other complaints.///).  HPI 64-year-old female with past medical history of  type 2 diabetes, morbid obesity, diastolic dysfunction, hypercholesteremia, hypertension, T2DM, morbid obesity, macular degeneration, retinal tear , colon polyps, hemorrhoids, mixed stress urge and stress urinary incontinence, anxious depression RA, OA, OLIVIA, iron deficiency anemia, peripheral edema, high risk breast surveillance -atypical ductal hyperplasia (1998 - did not take tamoxifen) and vit d deficiency presents today for annual physical exam.     She does not test her blood sugars.   Ate around 1:30 after labs drawn  She is up to date with covid and flu vaccine  Declines hiv and hep c   Last eye exam - due -- patient to schedule   Last dental exam - due - patient to schedule   Mammogram 9/16/2023  Mirena in place approximately 2018 - fibroids   New cpap in the past few months.   Bone density  Unable to assess due to body habitus per patient.  History of diverticulitis and father with colon cancer --- due for colonoscopy   Takes lasix accordingly,    Review of Systems  Review of systems: Not present-chills, fatigue and fever.  Skin: Not present-new lesions and rash.  HEENT: Not present-headache, ear pain, tinnitus, nasal congestion, and sore throat.  Neck: Not present-neck pain, neck stiffness and swollen glands.  Respiratory: OLIVIA. Not present-difficulty breathing, cough, bloody sputum.  Cardiovascular: Not present-abnormal blood pressure, chest pain, edema, fainting, leg pain, leg swelling, palpitations, dyspnea on exertion.  Gastrointestinal: Not present-abdominal pain, bloody or very black stools, changes in bowel habits, nausea and vomiting.  Genitourinary: Stress incontinence.  Not present- hematuria, flank pain and dysuria.  Musculoskeletal: OA, RA, arthralgias and gait problems. Not  "present-joint swelling, joint redness.  Neurological: Not present-dizziness, headache, numbness or tingling.  Psychiatric: Not present-anxiety, impaired cognitive functioning, insomnia, depression, trouble falling asleep, panic attacks, suicidal ideation, suicidal planning, thoughts of hurting others and thoughts of self-harm.  Endocrine: Not present-appetite changes, polydipsia, polyuria, and thyroid problems.  Hematology:  Not present-anemia, excessive bleeding, bruising and epistaxis.    Objective   /68 (BP Location: Right arm, Patient Position: Sitting, BP Cuff Size: Adult)   Pulse 96   Temp 36.8 °C (98.3 °F) (Temporal)   Resp 18   Ht 1.651 m (5' 5\")   SpO2 94%   BMI 64.20 kg/m²      Physical Exam  Gen.: Mental status-alert. Gen. appearance-cooperative, well groomed and consistent with stated age. Not in acute distress or sickly. Orientation-oriented to time, place, purpose and person. Build and nutrition-well-nourished and well-developed. Hydration-well-hydrated.    Integumentary: Color-normal coloration skin. Skin moisture-normal skin moisture.    Head and neck: Head-normocephalic, atraumatic with no lesions or palpable masses. Face-atraumatic. Neck-full range of motion and subtle. No lymphadenopathy and no nuchal rigidity. Thyroid-normal size and consistency with no palpable lumps.    ENMT: Ears-no tenderness noted to the external auditory canal-bilaterally. Otoscopic exam: Tympanic membranes-left-tympanic membrane is gray in appearance. Right-tympanic membrane is gray in appearance. Nose -frontal sinuses-non-tender and no purulent drainage. Maxillary sinuses-non-tender and no purulent drainage. Mouth: Oral mucosa-pink and moist. Oropharynx: No airway distress, bulging of the pharyngeal wall, edema of the uvula, and no pharyngeal erythema.    Chest and lung exam: Auscultation-normal breath sounds, no adventitious lung sounds. Chest wall is normal in shape and non-tender.    Cardiovascular: " Auscultation: Regular rate and rhythm. Heart sounds-normal heart sounds, S1-S2. No murmurs or gallops appreciated. Carotid arteries normal and without bruit.    Abdomen: Non-tender, no rigidity.  Auscultation-auscultation of the abdomen reveals normal bowel sounds throughout.     Peripheral vascular:  Normal temperature and chronic edema bilaterally.    Neurologic: Mental jgeloa-qxdqub-tfemrxvcoha. Cranial nerves: Cranial nerves II through XII grossly intact.     Musculoskeletal: Examination of the spine with no step-offs or point tenderness.     Lower extremities - chronic edema and bilateral knee crepitus.    Assessment/Plan   Diagnoses and all orders for this visit:  Annual physical exam  -     Albumin-Creatinine Ratio, Urine Random; Future  -     CBC; Future  -     Hemoglobin A1C; Future  -     Lipid Panel; Future  -     Colonoscopy Screening; High Risk Patient; father with colon cancer; Future  Diabetes mellitus type 2 without retinopathy (Multi)  -     metFORMIN (Glucophage) 850 mg tablet; Take 1 tablet (850 mg) by mouth 2 times daily (morning and late afternoon).  -     Hemoglobin A1C; Future  -     POCT Fingerstick Glucose manually resulted  Other iron deficiency anemia  Morbid obesity with body mass index (BMI) of 40.0 or higher (Multi)  -     Hemoglobin A1C; Future  -     Lipid Panel; Future  Benign essential hypertension  Screening for blood or protein in urine  -     Albumin-Creatinine Ratio, Urine Random; Future  Stress incontinence of urine  -     Referral to Gynecology; Future  IUD (intrauterine device) in place  -     Referral to Gynecology; Future  History of uterine fibroid  -     Referral to Gynecology; Future  Screening for colon cancer  -     Colonoscopy Screening; High Risk Patient; father with colon cancer; Future  Primary hypertension

## 2024-10-22 LAB
EST. AVERAGE GLUCOSE BLD GHB EST-MCNC: 126 MG/DL
HBA1C MFR BLD: 6 %

## 2024-11-14 ENCOUNTER — TELEPHONE (OUTPATIENT)
Dept: OBSTETRICS AND GYNECOLOGY | Facility: CLINIC | Age: 64
End: 2024-11-14
Payer: COMMERCIAL

## 2024-11-14 DIAGNOSIS — I10 HYPERTENSION, UNSPECIFIED TYPE: ICD-10-CM

## 2024-11-15 ENCOUNTER — TELEPHONE (OUTPATIENT)
Dept: OPHTHALMOLOGY | Age: 64
End: 2024-11-15
Payer: COMMERCIAL

## 2024-11-15 RX ORDER — AMLODIPINE BESYLATE 2.5 MG/1
7.5 TABLET ORAL DAILY
Qty: 270 TABLET | Refills: 0 | Status: SHIPPED | OUTPATIENT
Start: 2024-11-15 | End: 2024-11-20 | Stop reason: SDUPTHER

## 2024-11-20 ENCOUNTER — TELEPHONE (OUTPATIENT)
Dept: UROLOGY | Facility: CLINIC | Age: 64
End: 2024-11-20
Payer: COMMERCIAL

## 2024-11-20 DIAGNOSIS — I10 HYPERTENSION, UNSPECIFIED TYPE: ICD-10-CM

## 2024-11-20 DIAGNOSIS — I10 PRIMARY HYPERTENSION: ICD-10-CM

## 2024-11-20 DIAGNOSIS — F41.9 ANXIETY AND DEPRESSION: Primary | ICD-10-CM

## 2024-11-20 DIAGNOSIS — E78.00 HYPERCHOLESTEROLEMIA: ICD-10-CM

## 2024-11-20 DIAGNOSIS — F41.9 ANXIETY: Primary | ICD-10-CM

## 2024-11-20 DIAGNOSIS — F32.A ANXIETY AND DEPRESSION: Primary | ICD-10-CM

## 2024-11-20 RX ORDER — ATORVASTATIN CALCIUM 40 MG/1
40 TABLET, FILM COATED ORAL
Qty: 90 TABLET | Refills: 0 | Status: SHIPPED | OUTPATIENT
Start: 2024-11-20

## 2024-11-20 RX ORDER — BUPROPION HYDROCHLORIDE 300 MG/1
300 TABLET ORAL DAILY
Qty: 90 TABLET | Refills: 0 | OUTPATIENT
Start: 2024-11-20

## 2024-11-20 RX ORDER — LISINOPRIL 40 MG/1
40 TABLET ORAL DAILY
Qty: 90 TABLET | Refills: 1 | Status: SHIPPED | OUTPATIENT
Start: 2024-11-20

## 2024-11-20 RX ORDER — LISINOPRIL 40 MG/1
40 TABLET ORAL DAILY
Qty: 90 TABLET | Refills: 0 | OUTPATIENT
Start: 2024-11-20

## 2024-11-20 RX ORDER — ATORVASTATIN CALCIUM 40 MG/1
40 TABLET, FILM COATED ORAL
Qty: 90 TABLET | Refills: 0 | OUTPATIENT
Start: 2024-11-20

## 2024-11-20 RX ORDER — AMLODIPINE BESYLATE 2.5 MG/1
7.5 TABLET ORAL DAILY
Qty: 270 TABLET | Refills: 0 | Status: SHIPPED | OUTPATIENT
Start: 2024-11-20

## 2024-11-20 RX ORDER — BUPROPION HYDROCHLORIDE 300 MG/1
300 TABLET ORAL DAILY
Qty: 90 TABLET | Refills: 0 | Status: SHIPPED | OUTPATIENT
Start: 2024-11-20

## 2024-11-20 RX ORDER — AMLODIPINE BESYLATE 2.5 MG/1
7.5 TABLET ORAL DAILY
Qty: 270 TABLET | Refills: 0 | OUTPATIENT
Start: 2024-11-20

## 2024-12-30 ENCOUNTER — PATIENT MESSAGE (OUTPATIENT)
Dept: RHEUMATOLOGY | Facility: CLINIC | Age: 64
End: 2024-12-30
Payer: COMMERCIAL

## 2024-12-30 DIAGNOSIS — M06.9 RHEUMATOID ARTHRITIS, INVOLVING UNSPECIFIED SITE, UNSPECIFIED WHETHER RHEUMATOID FACTOR PRESENT (MULTI): ICD-10-CM

## 2024-12-30 DIAGNOSIS — M15.9 GENERALIZED OSTEOARTHRITIS: Primary | ICD-10-CM

## 2025-01-24 ENCOUNTER — TELEPHONE (OUTPATIENT)
Dept: PRIMARY CARE | Facility: CLINIC | Age: 65
End: 2025-01-24
Payer: COMMERCIAL

## 2025-01-24 DIAGNOSIS — N39.3 STRESS INCONTINENCE OF URINE: Primary | ICD-10-CM

## 2025-01-26 RX ORDER — TROSPIUM CHLORIDE ER 60 MG/1
60 CAPSULE ORAL DAILY
Qty: 90 CAPSULE | Refills: 0 | Status: SHIPPED | OUTPATIENT
Start: 2025-01-26

## 2025-02-06 ENCOUNTER — PATIENT MESSAGE (OUTPATIENT)
Dept: RHEUMATOLOGY | Facility: CLINIC | Age: 65
End: 2025-02-06
Payer: COMMERCIAL

## 2025-02-06 DIAGNOSIS — M15.9 GENERALIZED OSTEOARTHRITIS: Primary | ICD-10-CM

## 2025-02-06 DIAGNOSIS — M06.9 RHEUMATOID ARTHRITIS, INVOLVING UNSPECIFIED SITE, UNSPECIFIED WHETHER RHEUMATOID FACTOR PRESENT (MULTI): ICD-10-CM

## 2025-02-06 NOTE — LETTER
February 19, 2025     Patient: Belle Alarcon   YOB: 1960           To Whom It May Concern:    This patient is seen in my rheumatology clinic for musculoskeletal symptoms.  She has decrease in mobility and is unable to move her trash and recycling bins to the curb.  She lives alone.    I feel that it is medically necessary for her to have door pickup for her trash and recycling bins    If there is any questions, please do not hesitate to contact me at [phone].         Sincerely,        Carrol Ashford MD

## 2025-02-21 ENCOUNTER — TELEPHONE (OUTPATIENT)
Dept: PRIMARY CARE | Facility: CLINIC | Age: 65
End: 2025-02-21
Payer: COMMERCIAL

## 2025-02-21 DIAGNOSIS — F32.A ANXIETY AND DEPRESSION: ICD-10-CM

## 2025-02-21 DIAGNOSIS — F41.9 ANXIETY AND DEPRESSION: ICD-10-CM

## 2025-02-26 RX ORDER — BUPROPION HYDROCHLORIDE 300 MG/1
300 TABLET ORAL DAILY
Qty: 90 TABLET | Refills: 0 | OUTPATIENT
Start: 2025-02-26

## 2025-02-28 ENCOUNTER — TELEPHONE (OUTPATIENT)
Dept: PRIMARY CARE | Facility: CLINIC | Age: 65
End: 2025-02-28
Payer: COMMERCIAL

## 2025-02-28 DIAGNOSIS — I10 HYPERTENSION, UNSPECIFIED TYPE: ICD-10-CM

## 2025-02-28 RX ORDER — AMLODIPINE BESYLATE 2.5 MG/1
7.5 TABLET ORAL DAILY
Qty: 270 TABLET | Refills: 0 | Status: SHIPPED | OUTPATIENT
Start: 2025-02-28

## 2025-03-19 ENCOUNTER — APPOINTMENT (OUTPATIENT)
Dept: OPHTHALMOLOGY | Facility: CLINIC | Age: 65
End: 2025-03-19
Payer: COMMERCIAL

## 2025-03-31 ENCOUNTER — OFFICE VISIT (OUTPATIENT)
Dept: PRIMARY CARE | Facility: CLINIC | Age: 65
End: 2025-03-31
Payer: COMMERCIAL

## 2025-03-31 VITALS
HEART RATE: 78 BPM | RESPIRATION RATE: 10 BRPM | DIASTOLIC BLOOD PRESSURE: 74 MMHG | OXYGEN SATURATION: 96 % | SYSTOLIC BLOOD PRESSURE: 147 MMHG

## 2025-03-31 DIAGNOSIS — D50.8 OTHER IRON DEFICIENCY ANEMIA: ICD-10-CM

## 2025-03-31 DIAGNOSIS — E11.9 CONTROLLED TYPE 2 DIABETES MELLITUS WITHOUT COMPLICATION, WITHOUT LONG-TERM CURRENT USE OF INSULIN: ICD-10-CM

## 2025-03-31 DIAGNOSIS — I10 BENIGN ESSENTIAL HYPERTENSION: ICD-10-CM

## 2025-03-31 DIAGNOSIS — E66.01 MORBID OBESITY WITH BODY MASS INDEX (BMI) OF 40.0 OR HIGHER (MULTI): Primary | ICD-10-CM

## 2025-03-31 DIAGNOSIS — E78.00 HYPERCHOLESTEROLEMIA: ICD-10-CM

## 2025-03-31 PROCEDURE — 3078F DIAST BP <80 MM HG: CPT | Performed by: NURSE PRACTITIONER

## 2025-03-31 PROCEDURE — 1159F MED LIST DOCD IN RCRD: CPT | Performed by: NURSE PRACTITIONER

## 2025-03-31 PROCEDURE — 3077F SYST BP >= 140 MM HG: CPT | Performed by: NURSE PRACTITIONER

## 2025-03-31 PROCEDURE — 4010F ACE/ARB THERAPY RXD/TAKEN: CPT | Performed by: NURSE PRACTITIONER

## 2025-03-31 PROCEDURE — 99213 OFFICE O/P EST LOW 20 MIN: CPT | Performed by: NURSE PRACTITIONER

## 2025-03-31 RX ORDER — SEMAGLUTIDE 0.25 MG/.5ML
0.25 INJECTION, SOLUTION SUBCUTANEOUS WEEKLY
Qty: 2 ML | Refills: 0 | Status: SHIPPED | OUTPATIENT
Start: 2025-03-31 | End: 2025-04-22

## 2025-03-31 SDOH — ECONOMIC STABILITY: FOOD INSECURITY: WITHIN THE PAST 12 MONTHS, THE FOOD YOU BOUGHT JUST DIDN'T LAST AND YOU DIDN'T HAVE MONEY TO GET MORE.: NEVER TRUE

## 2025-03-31 SDOH — ECONOMIC STABILITY: FOOD INSECURITY: WITHIN THE PAST 12 MONTHS, YOU WORRIED THAT YOUR FOOD WOULD RUN OUT BEFORE YOU GOT MONEY TO BUY MORE.: NEVER TRUE

## 2025-03-31 ASSESSMENT — LIFESTYLE VARIABLES
HOW OFTEN DO YOU HAVE SIX OR MORE DRINKS ON ONE OCCASION: NEVER
SKIP TO QUESTIONS 9-10: 1
HOW MANY STANDARD DRINKS CONTAINING ALCOHOL DO YOU HAVE ON A TYPICAL DAY: PATIENT DOES NOT DRINK
HOW OFTEN DO YOU HAVE A DRINK CONTAINING ALCOHOL: NEVER
AUDIT-C TOTAL SCORE: 0

## 2025-03-31 ASSESSMENT — COLUMBIA-SUICIDE SEVERITY RATING SCALE - C-SSRS
1. IN THE PAST MONTH, HAVE YOU WISHED YOU WERE DEAD OR WISHED YOU COULD GO TO SLEEP AND NOT WAKE UP?: NO
6. HAVE YOU EVER DONE ANYTHING, STARTED TO DO ANYTHING, OR PREPARED TO DO ANYTHING TO END YOUR LIFE?: NO
2. HAVE YOU ACTUALLY HAD ANY THOUGHTS OF KILLING YOURSELF?: NO

## 2025-03-31 ASSESSMENT — ANXIETY QUESTIONNAIRES
IF YOU CHECKED OFF ANY PROBLEMS ON THIS QUESTIONNAIRE, HOW DIFFICULT HAVE THESE PROBLEMS MADE IT FOR YOU TO DO YOUR WORK, TAKE CARE OF THINGS AT HOME, OR GET ALONG WITH OTHER PEOPLE: NOT DIFFICULT AT ALL
7. FEELING AFRAID AS IF SOMETHING AWFUL MIGHT HAPPEN: NOT AT ALL
GAD7 TOTAL SCORE: 1
3. WORRYING TOO MUCH ABOUT DIFFERENT THINGS: SEVERAL DAYS
6. BECOMING EASILY ANNOYED OR IRRITABLE: NOT AT ALL
5. BEING SO RESTLESS THAT IT IS HARD TO SIT STILL: NOT AT ALL
2. NOT BEING ABLE TO STOP OR CONTROL WORRYING: NOT AT ALL
1. FEELING NERVOUS, ANXIOUS, OR ON EDGE: NOT AT ALL
4. TROUBLE RELAXING: NOT AT ALL

## 2025-03-31 ASSESSMENT — ENCOUNTER SYMPTOMS
OCCASIONAL FEELINGS OF UNSTEADINESS: 1
LOSS OF SENSATION IN FEET: 0
DEPRESSION: 0

## 2025-03-31 ASSESSMENT — PATIENT HEALTH QUESTIONNAIRE - PHQ9
SUM OF ALL RESPONSES TO PHQ9 QUESTIONS 1 & 2: 0
2. FEELING DOWN, DEPRESSED OR HOPELESS: NOT AT ALL
1. LITTLE INTEREST OR PLEASURE IN DOING THINGS: NOT AT ALL

## 2025-03-31 NOTE — PROGRESS NOTES
Subjective   Patient ID: Belle Alarcon is a 64 y.o. female who presents for No chief complaint on file..  HPI 64-year-old female with past medical history of diastolic dysfunction, hypercholesteremia, hypertension, T2DM, morbid obesity, macular degeneration, retinal tear , colon polyps, hemorrhoids, mixed stress urge and stress urinary incontinence, anxious depression RA, OA, OLIVIA, iron deficiency anemia, peripheral edema, high risk breast surveillance -atypical ductal hyperplasia (1998 - did not take tamoxifen) and vit d deficiency presents today for weight loss     No personal or family history of thyroid cancer   Difficulty ambulating due to body habitus   Uses wheelchair     Hemoglobin A1c 6.0 10/21/2024    Review of Systems  Review of systems: Present-feeling well. Not present-chills, fatigue and fever.  Skin: Not present- hives, new lesions and rash.  HEENT: Not present-headache, diplopia, visual loss, ear pain, tinnitus, vertigo, seasonal allergies, nasal congestion, and sore throat.  Neck: Not present-neck pain, neck stiffness and swollen glands.  Respiratory: OLIVIA.  Not present-difficulty breathing, cough, bloody sputum.  Cardiovascular: Not present-abnormal blood pressure, chest pain, edema, dyspnea on exertion.  Gastrointestinal: Not present-abdominal pain, bloody or very black stools, changes in bowel habits, heartburn, incontinence of stool, jaundice, nausea and vomiting.  Genitourinary: Not present-change in bladder habits, hematuria, flank pain and dysuria.  Musculoskeletal: Multiple joint pain.  RA/OA  Neurological: Not present-dizziness, headache, trouble walking, vertigo, weakness, numbness or tingling.  Psychiatric: Depressed mood due to immobility and weight.  Not present-suicidal ideation, suicidal planning, thoughts of hurting others and thoughts of self-harm.  Endocrine: Not present-appetite changes, polydipsia, polyuria, and thyroid problems.  Hematology:  Not present-anemia, excessive  bleeding, bruising and epistaxis.    Objective   There were no vitals taken for this visit.     Physical Exam  Gen.: Mental status-alert. Gen. appearance-cooperative, well groomed and consistent with stated age. Not in acute distress or sickly. Orientation-oriented to time, place, purpose and person. Build and nutrition-well-nourished and well-developed. Hydration-well-hydrated.    Integumentary: Color-normal coloration skin. Skin moisture-normal skin moisture.    Head and neck: Head-normocephalic, atraumatic with no lesions or palpable masses. Face-atraumatic. Neck-full range of motion and subtle. No lymphadenopathy, no palpable masses on the right, no palpable masses on the left and no nuchal rigidity. Thyroid-normal size and consistency with no palpable lumps.    ENMT: Ears-no tenderness noted to the external auditory canal-bilaterally. Otoscopic exam: Tympanic membranes-left-tympanic membrane is gray in appearance. Right-tympanic membrane is gray in appearance. Nose -frontal sinuses-non-tender and no purulent drainage. Maxillary sinuses-non-tender and no purulent drainage. Mouth: Oral mucosa-pink and moist. Oropharynx: No airway distress, bulging of the pharyngeal wall, edema of the uvula, and no pharyngeal erythema.    Chest and lung exam: Chest and lung exam reveals-normal excursion with symmetric chest walls, quiet, even and easy respiratory effort with no use of accessory muscles.  Auscultation-normal breath sounds, no adventitious lung sounds and normal vocal resonance. Chest wall is normal in shape and non-tender.    Cardiovascular: Auscultation: Regular rate and rhythm. Heart sounds-normal heart sounds, S1-S2. No murmurs or gallops appreciated. Carotid arteries normal and without bruit.    Abdomen: Inspection-inspection of the abdomen reveals no hernias. Palpation/percussion: Non-tender, no rigidity, and no palpable masses. There is no hepatosplenomegaly. Auscultation-auscultation of the abdomen reveals  normal bowel sounds throughout.     Peripheral vascular: Lower extremity-inspection is normal bilaterally. Normal temperature and no edema bilaterally.    Neurologic: Mental cmvnda-oobukx-onhrkgaddln. Cranial nerves: Cranial nerves II through XII grossly intact. Normal gait.    Musculoskeletal: Examination of the spine with no step-offs or point tenderness.  Full range of motion of the spine without pain or difficulty. Right lower extremity-normal strength and tone, normal range of motion without pain. Left lower extremity-normal strength and tone, normal range of motion without pain.  Assessment/Plan   Diagnoses and all orders for this visit:  Morbid obesity with body mass index (BMI) of 40.0 or higher (Multi)  -     semaglutide, weight loss, (Wegovy) 0.25 mg/0.5 mL pen injector; Inject 0.25 mg under the skin 1 (one) time per week for 4 doses.  -     CBC; Future  -     Comprehensive Metabolic Panel; Future  -     Hemoglobin A1C; Future  -     Lipid Panel; Future  -     TSH with reflex to Free T4 if abnormal; Future  Controlled type 2 diabetes mellitus without complication, without long-term current use of insulin (Multi)  -     semaglutide, weight loss, (Wegovy) 0.25 mg/0.5 mL pen injector; Inject 0.25 mg under the skin 1 (one) time per week for 4 doses.  -     CBC; Future  -     Comprehensive Metabolic Panel; Future  -     Hemoglobin A1C; Future  -     Lipid Panel; Future  -     TSH with reflex to Free T4 if abnormal; Future  Hypercholesterolemia  -     Lipid Panel; Future  Other iron deficiency anemia  -     CBC; Future  Benign essential hypertension  -     CBC; Future  -     Comprehensive Metabolic Panel; Future

## 2025-04-10 ENCOUNTER — APPOINTMENT (OUTPATIENT)
Dept: SLEEP MEDICINE | Facility: HOSPITAL | Age: 65
End: 2025-04-10
Payer: COMMERCIAL

## 2025-04-19 LAB
ALBUMIN SERPL-MCNC: 4.4 G/DL (ref 3.6–5.1)
ALP SERPL-CCNC: 131 U/L (ref 37–153)
ALT SERPL-CCNC: 12 U/L (ref 6–29)
ANION GAP SERPL CALCULATED.4IONS-SCNC: 14 MMOL/L (CALC) (ref 7–17)
AST SERPL-CCNC: 13 U/L (ref 10–35)
BILIRUB SERPL-MCNC: 0.5 MG/DL (ref 0.2–1.2)
BUN SERPL-MCNC: 23 MG/DL (ref 7–25)
CALCIUM SERPL-MCNC: 9.2 MG/DL (ref 8.6–10.4)
CHLORIDE SERPL-SCNC: 105 MMOL/L (ref 98–110)
CHOLEST SERPL-MCNC: 156 MG/DL
CHOLEST/HDLC SERPL: 2.6 (CALC)
CO2 SERPL-SCNC: 21 MMOL/L (ref 20–32)
CREAT SERPL-MCNC: 0.96 MG/DL (ref 0.5–1.05)
EGFRCR SERPLBLD CKD-EPI 2021: 65 ML/MIN/1.73M2
ERYTHROCYTE [DISTWIDTH] IN BLOOD BY AUTOMATED COUNT: 13.4 % (ref 11–15)
EST. AVERAGE GLUCOSE BLD GHB EST-MCNC: 140 MG/DL
EST. AVERAGE GLUCOSE BLD GHB EST-SCNC: 7.7 MMOL/L
GLUCOSE SERPL-MCNC: 119 MG/DL (ref 65–99)
HBA1C MFR BLD: 6.5 %
HCT VFR BLD AUTO: 37.7 % (ref 35–45)
HDLC SERPL-MCNC: 59 MG/DL
HGB BLD-MCNC: 12.4 G/DL (ref 11.7–15.5)
LDLC SERPL CALC-MCNC: 74 MG/DL (CALC)
MCH RBC QN AUTO: 30 PG (ref 27–33)
MCHC RBC AUTO-ENTMCNC: 32.9 G/DL (ref 32–36)
MCV RBC AUTO: 91.1 FL (ref 80–100)
NONHDLC SERPL-MCNC: 97 MG/DL (CALC)
PLATELET # BLD AUTO: 242 THOUSAND/UL (ref 140–400)
PMV BLD REES-ECKER: 11.1 FL (ref 7.5–12.5)
POTASSIUM SERPL-SCNC: 4.3 MMOL/L (ref 3.5–5.3)
PROT SERPL-MCNC: 7 G/DL (ref 6.1–8.1)
RBC # BLD AUTO: 4.14 MILLION/UL (ref 3.8–5.1)
SODIUM SERPL-SCNC: 140 MMOL/L (ref 135–146)
TRIGL SERPL-MCNC: 150 MG/DL
TSH SERPL-ACNC: 1.08 MIU/L (ref 0.4–4.5)
WBC # BLD AUTO: 8.9 THOUSAND/UL (ref 3.8–10.8)

## 2025-04-29 ENCOUNTER — TELEPHONE (OUTPATIENT)
Dept: PRIMARY CARE | Facility: CLINIC | Age: 65
End: 2025-04-29
Payer: COMMERCIAL

## 2025-04-29 NOTE — TELEPHONE ENCOUNTER
LVM re: labs and weight loss medication follow up.    Overall blood work looks good with exception of hemoglobin A1c 6.5 -which is diabetes and need to discuss further management.

## 2025-04-30 ENCOUNTER — TELEPHONE (OUTPATIENT)
Dept: PRIMARY CARE | Facility: CLINIC | Age: 65
End: 2025-04-30
Payer: COMMERCIAL

## 2025-04-30 DIAGNOSIS — E66.01 MORBID OBESITY WITH BODY MASS INDEX (BMI) OF 40.0 OR HIGHER (MULTI): Primary | ICD-10-CM

## 2025-04-30 DIAGNOSIS — E11.9 CONTROLLED TYPE 2 DIABETES MELLITUS WITHOUT COMPLICATION, WITHOUT LONG-TERM CURRENT USE OF INSULIN: ICD-10-CM

## 2025-04-30 NOTE — TELEPHONE ENCOUNTER
Spoke with [facility]: denial for R[facility].    Referral placed for weight watchers.    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

## 2025-05-02 ENCOUNTER — TELEPHONE (OUTPATIENT)
Dept: OBSTETRICS AND GYNECOLOGY | Facility: CLINIC | Age: 65
End: 2025-05-02
Payer: COMMERCIAL

## 2025-05-02 DIAGNOSIS — E11.9 DIABETES MELLITUS TYPE 2 WITHOUT RETINOPATHY (MULTI): ICD-10-CM

## 2025-05-02 DIAGNOSIS — N39.3 STRESS INCONTINENCE OF URINE: ICD-10-CM

## 2025-05-05 RX ORDER — METFORMIN HYDROCHLORIDE 850 MG/1
850 TABLET ORAL
Qty: 180 TABLET | Refills: 0 | Status: SHIPPED | OUTPATIENT
Start: 2025-05-05 | End: 2025-08-03

## 2025-05-05 RX ORDER — TROSPIUM CHLORIDE ER 60 MG/1
60 CAPSULE ORAL DAILY
Qty: 90 CAPSULE | Refills: 0 | Status: SHIPPED | OUTPATIENT
Start: 2025-05-05

## 2025-05-29 ENCOUNTER — APPOINTMENT (OUTPATIENT)
Dept: RHEUMATOLOGY | Facility: CLINIC | Age: 65
End: 2025-05-29
Payer: COMMERCIAL

## 2025-06-10 ENCOUNTER — APPOINTMENT (OUTPATIENT)
Dept: SLEEP MEDICINE | Facility: HOSPITAL | Age: 65
End: 2025-06-10
Payer: COMMERCIAL

## 2025-06-12 ENCOUNTER — APPOINTMENT (OUTPATIENT)
Dept: RHEUMATOLOGY | Facility: CLINIC | Age: 65
End: 2025-06-12
Payer: COMMERCIAL

## 2025-06-12 ENCOUNTER — APPOINTMENT (OUTPATIENT)
Dept: SLEEP MEDICINE | Facility: HOSPITAL | Age: 65
End: 2025-06-12
Payer: COMMERCIAL

## 2025-06-16 DIAGNOSIS — I10 HYPERTENSION, UNSPECIFIED TYPE: ICD-10-CM

## 2025-06-16 RX ORDER — AMLODIPINE BESYLATE 2.5 MG/1
7.5 TABLET ORAL DAILY
Qty: 270 TABLET | Refills: 0 | Status: SHIPPED | OUTPATIENT
Start: 2025-06-16

## 2025-06-17 ENCOUNTER — APPOINTMENT (OUTPATIENT)
Dept: SLEEP MEDICINE | Facility: HOSPITAL | Age: 65
End: 2025-06-17
Payer: COMMERCIAL

## 2025-07-02 DIAGNOSIS — E78.00 HYPERCHOLESTEROLEMIA: ICD-10-CM

## 2025-07-02 RX ORDER — ATORVASTATIN CALCIUM 40 MG/1
40 TABLET, FILM COATED ORAL
Qty: 90 TABLET | Refills: 0 | Status: SHIPPED | OUTPATIENT
Start: 2025-07-02

## 2025-07-14 DIAGNOSIS — M06.09 RHEUMATOID ARTHRITIS OF MULTIPLE SITES WITH NEGATIVE RHEUMATOID FACTOR (MULTI): ICD-10-CM

## 2025-07-14 RX ORDER — CELECOXIB 200 MG/1
200 CAPSULE ORAL 2 TIMES DAILY
Qty: 180 CAPSULE | Refills: 0 | Status: SHIPPED | OUTPATIENT
Start: 2025-07-14 | End: 2025-10-12

## 2025-07-14 NOTE — TELEPHONE ENCOUNTER
Faxed refill request from patient pharmacy    Rx: Celecoxib 200mg capsule  Sig: take 2 capsules (400mg) daily  Qty: 180  [facility]    LOV: 5/23/24  NOV:7/31/25    Prescription entered and sent to provider to renew.    Caridad Treviño RN

## 2025-07-29 ENCOUNTER — APPOINTMENT (OUTPATIENT)
Dept: SLEEP MEDICINE | Facility: HOSPITAL | Age: 65
End: 2025-07-29
Payer: COMMERCIAL

## 2025-07-30 ENCOUNTER — TELEPHONE (OUTPATIENT)
Dept: RHEUMATOLOGY | Facility: CLINIC | Age: 65
End: 2025-07-30
Payer: COMMERCIAL

## 2025-07-30 DIAGNOSIS — M15.9 GENERALIZED OSTEOARTHRITIS: Primary | ICD-10-CM

## 2025-07-30 DIAGNOSIS — M06.9 RHEUMATOID ARTHRITIS, INVOLVING UNSPECIFIED SITE, UNSPECIFIED WHETHER RHEUMATOID FACTOR PRESENT (MULTI): ICD-10-CM

## 2025-07-31 ENCOUNTER — APPOINTMENT (OUTPATIENT)
Dept: RHEUMATOLOGY | Facility: CLINIC | Age: 65
End: 2025-07-31
Payer: COMMERCIAL

## 2025-08-12 DIAGNOSIS — N39.3 STRESS INCONTINENCE OF URINE: ICD-10-CM

## 2025-08-12 DIAGNOSIS — E11.9 DIABETES MELLITUS TYPE 2 WITHOUT RETINOPATHY (MULTI): ICD-10-CM

## 2025-08-13 RX ORDER — METFORMIN HYDROCHLORIDE 850 MG/1
850 TABLET ORAL
Qty: 180 TABLET | Refills: 0 | Status: SHIPPED | OUTPATIENT
Start: 2025-08-13 | End: 2025-11-11

## 2025-08-13 RX ORDER — TROSPIUM CHLORIDE ER 60 MG/1
60 CAPSULE ORAL DAILY
Qty: 90 CAPSULE | Refills: 0 | Status: SHIPPED | OUTPATIENT
Start: 2025-08-13

## 2025-08-28 ENCOUNTER — APPOINTMENT (OUTPATIENT)
Dept: RHEUMATOLOGY | Facility: CLINIC | Age: 65
End: 2025-08-28
Payer: COMMERCIAL

## 2025-08-29 LAB
ALBUMIN SERPL-MCNC: 4.1 G/DL (ref 3.6–5.1)
ALP SERPL-CCNC: 127 U/L (ref 37–153)
ALT SERPL-CCNC: 14 U/L (ref 6–29)
ANION GAP SERPL CALCULATED.4IONS-SCNC: 10 MMOL/L (CALC) (ref 7–17)
AST SERPL-CCNC: 15 U/L (ref 10–35)
BILIRUB SERPL-MCNC: 0.4 MG/DL (ref 0.2–1.2)
BUN SERPL-MCNC: 19 MG/DL (ref 7–25)
CALCIUM SERPL-MCNC: 9.1 MG/DL (ref 8.6–10.4)
CCP IGG SERPL-ACNC: 51 UNITS
CHLORIDE SERPL-SCNC: 106 MMOL/L (ref 98–110)
CO2 SERPL-SCNC: 25 MMOL/L (ref 20–32)
CREAT SERPL-MCNC: 0.79 MG/DL (ref 0.5–1.05)
CRP SERPL-MCNC: 16 MG/L
EGFRCR SERPLBLD CKD-EPI 2021: 82 ML/MIN/1.73M2
ERYTHROCYTE [DISTWIDTH] IN BLOOD BY AUTOMATED COUNT: 13.1 % (ref 11–15)
ERYTHROCYTE [SEDIMENTATION RATE] IN BLOOD BY WESTERGREN METHOD: 19 MM/H
GLUCOSE SERPL-MCNC: 103 MG/DL (ref 65–139)
HCT VFR BLD AUTO: 36.7 % (ref 35–45)
HGB BLD-MCNC: 11.9 G/DL (ref 11.7–15.5)
MCH RBC QN AUTO: 29.8 PG (ref 27–33)
MCHC RBC AUTO-ENTMCNC: 32.4 G/DL (ref 32–36)
MCV RBC AUTO: 91.8 FL (ref 80–100)
PLATELET # BLD AUTO: 204 THOUSAND/UL (ref 140–400)
PMV BLD REES-ECKER: 10.9 FL (ref 7.5–12.5)
POTASSIUM SERPL-SCNC: 4.4 MMOL/L (ref 3.5–5.3)
PROT SERPL-MCNC: 6.7 G/DL (ref 6.1–8.1)
RBC # BLD AUTO: 4 MILLION/UL (ref 3.8–5.1)
RHEUMATOID FACT SERPL-ACNC: <10 IU/ML
SODIUM SERPL-SCNC: 141 MMOL/L (ref 135–146)
WBC # BLD AUTO: 6.9 THOUSAND/UL (ref 3.8–10.8)